# Patient Record
Sex: MALE | Race: WHITE | Employment: OTHER
[De-identification: names, ages, dates, MRNs, and addresses within clinical notes are randomized per-mention and may not be internally consistent; named-entity substitution may affect disease eponyms.]

---

## 2023-04-17 ENCOUNTER — APPOINTMENT (OUTPATIENT)
Facility: HOSPITAL | Age: 38
End: 2023-04-17
Payer: MEDICAID

## 2023-04-17 ENCOUNTER — HOSPITAL ENCOUNTER (INPATIENT)
Facility: HOSPITAL | Age: 38
LOS: 4 days | Discharge: HOSPICE | End: 2023-04-21
Attending: EMERGENCY MEDICINE | Admitting: HOSPITALIST
Payer: MEDICAID

## 2023-04-17 DIAGNOSIS — E87.20 LACTIC ACIDOSIS: ICD-10-CM

## 2023-04-17 DIAGNOSIS — R07.9 CHEST PAIN, UNSPECIFIED TYPE: ICD-10-CM

## 2023-04-17 DIAGNOSIS — R00.0 TACHYCARDIA: ICD-10-CM

## 2023-04-17 DIAGNOSIS — E11.69 TYPE 2 DIABETES MELLITUS WITH OTHER SPECIFIED COMPLICATION, WITHOUT LONG-TERM CURRENT USE OF INSULIN (HCC): ICD-10-CM

## 2023-04-17 DIAGNOSIS — R00.0 SINUS TACHYCARDIA: ICD-10-CM

## 2023-04-17 DIAGNOSIS — R73.9 HYPERGLYCEMIA: Primary | ICD-10-CM

## 2023-04-17 DIAGNOSIS — I10 HYPERTENSION, UNSPECIFIED TYPE: ICD-10-CM

## 2023-04-17 LAB
ALBUMIN SERPL-MCNC: 3.8 G/DL (ref 3.4–5)
ALBUMIN/GLOB SERPL: 1.1 (ref 0.8–1.7)
ALP SERPL-CCNC: 197 U/L (ref 45–117)
ALT SERPL-CCNC: 66 U/L (ref 16–61)
AMPHET UR QL SCN: NEGATIVE
ANION GAP SERPL CALC-SCNC: 8 MMOL/L (ref 3–18)
APPEARANCE UR: CLEAR
AST SERPL-CCNC: 37 U/L (ref 10–38)
BARBITURATES UR QL SCN: NEGATIVE
BASE DEFICIT BLDV-SCNC: 2.1 MMOL/L
BASOPHILS # BLD: 0.1 K/UL (ref 0–0.1)
BASOPHILS NFR BLD: 1 % (ref 0–2)
BENZODIAZ UR QL: NEGATIVE
BILIRUB SERPL-MCNC: 0.8 MG/DL (ref 0.2–1)
BILIRUB UR QL: NEGATIVE
BUN SERPL-MCNC: 14 MG/DL (ref 7–18)
BUN/CREAT SERPL: 15 (ref 12–20)
CALCIUM SERPL-MCNC: 9.2 MG/DL (ref 8.5–10.1)
CANNABINOIDS UR QL SCN: NEGATIVE
CHLORIDE SERPL-SCNC: 98 MMOL/L (ref 100–111)
CO2 SERPL-SCNC: 26 MMOL/L (ref 21–32)
COCAINE UR QL SCN: NEGATIVE
COLOR UR: YELLOW
CREAT SERPL-MCNC: 0.95 MG/DL (ref 0.6–1.3)
D DIMER PPP FEU-MCNC: <0.27 UG/ML(FEU)
DIFFERENTIAL METHOD BLD: ABNORMAL
EOSINOPHIL # BLD: 0.1 K/UL (ref 0–0.4)
EOSINOPHIL NFR BLD: 1 % (ref 0–5)
ERYTHROCYTE [DISTWIDTH] IN BLOOD BY AUTOMATED COUNT: 12.3 % (ref 11.6–14.5)
EST. AVERAGE GLUCOSE BLD GHB EST-MCNC: 246 MG/DL
FLUAV AG NPH QL IA: NEGATIVE
FLUBV AG NOSE QL IA: NEGATIVE
GLOBULIN SER CALC-MCNC: 3.4 G/DL (ref 2–4)
GLUCOSE BLD STRIP.AUTO-MCNC: 238 MG/DL (ref 70–110)
GLUCOSE BLD STRIP.AUTO-MCNC: 405 MG/DL (ref 70–110)
GLUCOSE BLD STRIP.AUTO-MCNC: 422 MG/DL (ref 70–110)
GLUCOSE SERPL-MCNC: 451 MG/DL (ref 74–99)
GLUCOSE UR STRIP.AUTO-MCNC: >1000 MG/DL
HBA1C MFR BLD: 10.2 % (ref 4.2–5.6)
HCO3 BLDV-SCNC: 22.8 MMOL/L (ref 23–28)
HCT VFR BLD AUTO: 48.9 % (ref 36–48)
HGB BLD-MCNC: 16.8 G/DL (ref 13–16)
HGB UR QL STRIP: NEGATIVE
IMM GRANULOCYTES # BLD AUTO: 0.1 K/UL (ref 0–0.04)
IMM GRANULOCYTES NFR BLD AUTO: 1 % (ref 0–0.5)
KETONES UR QL STRIP.AUTO: ABNORMAL MG/DL
LACTATE BLD-SCNC: 0.96 MMOL/L (ref 0.4–2)
LACTATE BLD-SCNC: 2.08 MMOL/L (ref 0.4–2)
LACTATE BLD-SCNC: 2.55 MMOL/L (ref 0.4–2)
LACTATE BLD-SCNC: 3.1 MMOL/L (ref 0.4–2)
LEUKOCYTE ESTERASE UR QL STRIP.AUTO: NEGATIVE
LYMPHOCYTES # BLD: 2.3 K/UL (ref 0.9–3.6)
LYMPHOCYTES NFR BLD: 26 % (ref 21–52)
Lab: NORMAL
MAGNESIUM SERPL-MCNC: 2 MG/DL (ref 1.6–2.6)
MCH RBC QN AUTO: 27.3 PG (ref 24–34)
MCHC RBC AUTO-ENTMCNC: 34.4 G/DL (ref 31–37)
MCV RBC AUTO: 79.5 FL (ref 78–100)
METHADONE UR QL: NEGATIVE
MONOCYTES # BLD: 0.7 K/UL (ref 0.05–1.2)
MONOCYTES NFR BLD: 7 % (ref 3–10)
NEUTS SEG # BLD: 5.9 K/UL (ref 1.8–8)
NEUTS SEG NFR BLD: 65 % (ref 40–73)
NITRITE UR QL STRIP.AUTO: NEGATIVE
NRBC # BLD: 0 K/UL (ref 0–0.01)
NRBC BLD-RTO: 0 PER 100 WBC
NT PRO BNP: 89 PG/ML (ref 0–450)
OPIATES UR QL: NEGATIVE
PCO2 BLDV: 38.7 MMHG (ref 41–51)
PCP UR QL: NEGATIVE
PH BLDV: 7.38 (ref 7.32–7.42)
PH UR STRIP: 5.5 (ref 5–8)
PLATELET # BLD AUTO: 223 K/UL (ref 135–420)
PMV BLD AUTO: 10.3 FL (ref 9.2–11.8)
PO2 BLDV: 37 MMHG (ref 25–40)
POTASSIUM SERPL-SCNC: 4.2 MMOL/L (ref 3.5–5.5)
PROT SERPL-MCNC: 7.2 G/DL (ref 6.4–8.2)
PROT UR STRIP-MCNC: NEGATIVE MG/DL
RBC # BLD AUTO: 6.15 M/UL (ref 4.35–5.65)
SAO2 % BLDV: 69.5 % (ref 65–88)
SARS-COV-2 RDRP RESP QL NAA+PROBE: NOT DETECTED
SERVICE CMNT-IMP: ABNORMAL
SODIUM SERPL-SCNC: 132 MMOL/L (ref 136–145)
SOURCE: NORMAL
SP GR UR REFRACTOMETRY: >1.03 (ref 1–1.03)
SPECIMEN TYPE: ABNORMAL
T4 FREE SERPL-MCNC: 1.5 NG/DL (ref 0.7–1.5)
TROPONIN I SERPL HS-MCNC: 6 NG/L (ref 0–78)
TROPONIN I SERPL HS-MCNC: 6 NG/L (ref 0–78)
TSH SERPL DL<=0.05 MIU/L-ACNC: 1.9 UIU/ML (ref 0.36–3.74)
UROBILINOGEN UR QL STRIP.AUTO: 0.2 EU/DL (ref 0.2–1)
WBC # BLD AUTO: 9.1 K/UL (ref 4.6–13.2)

## 2023-04-17 PROCEDURE — 71045 X-RAY EXAM CHEST 1 VIEW: CPT

## 2023-04-17 PROCEDURE — 1100000003 HC PRIVATE W/ TELEMETRY

## 2023-04-17 PROCEDURE — 87040 BLOOD CULTURE FOR BACTERIA: CPT

## 2023-04-17 PROCEDURE — 96361 HYDRATE IV INFUSION ADD-ON: CPT

## 2023-04-17 PROCEDURE — 2580000003 HC RX 258: Performed by: PHYSICIAN ASSISTANT

## 2023-04-17 PROCEDURE — 84443 ASSAY THYROID STIM HORMONE: CPT

## 2023-04-17 PROCEDURE — 71275 CT ANGIOGRAPHY CHEST: CPT

## 2023-04-17 PROCEDURE — 96366 THER/PROPH/DIAG IV INF ADDON: CPT

## 2023-04-17 PROCEDURE — 82803 BLOOD GASES ANY COMBINATION: CPT

## 2023-04-17 PROCEDURE — 83036 HEMOGLOBIN GLYCOSYLATED A1C: CPT

## 2023-04-17 PROCEDURE — 83605 ASSAY OF LACTIC ACID: CPT

## 2023-04-17 PROCEDURE — 85025 COMPLETE CBC W/AUTO DIFF WBC: CPT

## 2023-04-17 PROCEDURE — 85379 FIBRIN DEGRADATION QUANT: CPT

## 2023-04-17 PROCEDURE — 80053 COMPREHEN METABOLIC PANEL: CPT

## 2023-04-17 PROCEDURE — 87804 INFLUENZA ASSAY W/OPTIC: CPT

## 2023-04-17 PROCEDURE — 84439 ASSAY OF FREE THYROXINE: CPT

## 2023-04-17 PROCEDURE — 74177 CT ABD & PELVIS W/CONTRAST: CPT

## 2023-04-17 PROCEDURE — 80307 DRUG TEST PRSMV CHEM ANLYZR: CPT

## 2023-04-17 PROCEDURE — 93005 ELECTROCARDIOGRAM TRACING: CPT | Performed by: EMERGENCY MEDICINE

## 2023-04-17 PROCEDURE — 96365 THER/PROPH/DIAG IV INF INIT: CPT

## 2023-04-17 PROCEDURE — 6370000000 HC RX 637 (ALT 250 FOR IP): Performed by: PHYSICIAN ASSISTANT

## 2023-04-17 PROCEDURE — 83735 ASSAY OF MAGNESIUM: CPT

## 2023-04-17 PROCEDURE — 82962 GLUCOSE BLOOD TEST: CPT

## 2023-04-17 PROCEDURE — 83880 ASSAY OF NATRIURETIC PEPTIDE: CPT

## 2023-04-17 PROCEDURE — 6360000004 HC RX CONTRAST MEDICATION: Performed by: EMERGENCY MEDICINE

## 2023-04-17 PROCEDURE — 6360000002 HC RX W HCPCS: Performed by: PHYSICIAN ASSISTANT

## 2023-04-17 PROCEDURE — 81003 URINALYSIS AUTO W/O SCOPE: CPT

## 2023-04-17 PROCEDURE — 99285 EMERGENCY DEPT VISIT HI MDM: CPT

## 2023-04-17 PROCEDURE — 87635 SARS-COV-2 COVID-19 AMP PRB: CPT

## 2023-04-17 PROCEDURE — 96375 TX/PRO/DX INJ NEW DRUG ADDON: CPT

## 2023-04-17 PROCEDURE — 84484 ASSAY OF TROPONIN QUANT: CPT

## 2023-04-17 RX ORDER — SODIUM CHLORIDE 9 MG/ML
INJECTION, SOLUTION INTRAVENOUS CONTINUOUS
Status: DISCONTINUED | OUTPATIENT
Start: 2023-04-17 | End: 2023-04-20

## 2023-04-17 RX ORDER — LETROZOLE 2.5 MG/1
TABLET, FILM COATED ORAL
COMMUNITY
Start: 2023-03-20

## 2023-04-17 RX ORDER — SODIUM CHLORIDE, SODIUM LACTATE, POTASSIUM CHLORIDE, AND CALCIUM CHLORIDE .6; .31; .03; .02 G/100ML; G/100ML; G/100ML; G/100ML
1000 INJECTION, SOLUTION INTRAVENOUS ONCE
Status: COMPLETED | OUTPATIENT
Start: 2023-04-17 | End: 2023-04-17

## 2023-04-17 RX ORDER — OMEPRAZOLE 20 MG/1
20 TABLET, DELAYED RELEASE ORAL DAILY PRN
COMMUNITY
Start: 2015-04-10

## 2023-04-17 RX ORDER — TESTOSTERONE 4 MG/D
PATCH TRANSDERMAL
COMMUNITY
Start: 2023-02-25

## 2023-04-17 RX ADMIN — INSULIN HUMAN 5 UNITS: 100 INJECTION, SOLUTION PARENTERAL at 15:17

## 2023-04-17 RX ADMIN — SODIUM CHLORIDE: 9 INJECTION, SOLUTION INTRAVENOUS at 19:42

## 2023-04-17 RX ADMIN — SODIUM CHLORIDE, POTASSIUM CHLORIDE, SODIUM LACTATE AND CALCIUM CHLORIDE 1000 ML: 600; 310; 30; 20 INJECTION, SOLUTION INTRAVENOUS at 13:06

## 2023-04-17 RX ADMIN — PIPERACILLIN AND TAZOBACTAM 4500 MG: 4; .5 INJECTION, POWDER, FOR SOLUTION INTRAVENOUS at 13:06

## 2023-04-17 RX ADMIN — VANCOMYCIN HYDROCHLORIDE 1000 MG: 1 INJECTION, POWDER, LYOPHILIZED, FOR SOLUTION INTRAVENOUS at 13:13

## 2023-04-17 RX ADMIN — SODIUM CHLORIDE, POTASSIUM CHLORIDE, SODIUM LACTATE AND CALCIUM CHLORIDE 1000 ML: 600; 310; 30; 20 INJECTION, SOLUTION INTRAVENOUS at 15:20

## 2023-04-17 RX ADMIN — IOPAMIDOL 100 ML: 755 INJECTION, SOLUTION INTRAVENOUS at 17:43

## 2023-04-17 RX ADMIN — VANCOMYCIN HYDROCHLORIDE 1000 MG: 1 INJECTION, POWDER, LYOPHILIZED, FOR SOLUTION INTRAVENOUS at 15:20

## 2023-04-17 ASSESSMENT — ENCOUNTER SYMPTOMS
VOMITING: 0
COLOR CHANGE: 0
RHINORRHEA: 0
SHORTNESS OF BREATH: 0
ABDOMINAL PAIN: 0
DIARRHEA: 0

## 2023-04-17 ASSESSMENT — PAIN DESCRIPTION - LOCATION: LOCATION: CHEST

## 2023-04-17 ASSESSMENT — LIFESTYLE VARIABLES
HOW MANY STANDARD DRINKS CONTAINING ALCOHOL DO YOU HAVE ON A TYPICAL DAY: 1 OR 2
HOW OFTEN DO YOU HAVE A DRINK CONTAINING ALCOHOL: MONTHLY OR LESS

## 2023-04-17 ASSESSMENT — PAIN SCALES - GENERAL
PAINLEVEL_OUTOF10: 0
PAINLEVEL_OUTOF10: 5

## 2023-04-17 ASSESSMENT — PAIN - FUNCTIONAL ASSESSMENT: PAIN_FUNCTIONAL_ASSESSMENT: 0-10

## 2023-04-17 NOTE — ED NOTES
Pt A&Ox 4. Patient resting comfortably on the stretcher with family at the bedside. Patient has no signs or symptoms of acute distress at this time. Patient has no concerns or questions about their treatment plan.       Idella Leventhal, RN  04/17/23 1956

## 2023-04-17 NOTE — ED NOTES
Pt A&Ox 4. Patient resting comfortably on the stretcher with family at the bedside. Patient has no signs or symptoms of acute distress at this time. Patient has no concerns or questions about their treatment plan.       Shai Tellez RN  04/17/23 1955

## 2023-04-17 NOTE — ED NOTES
POC Lactic 0.98, provider notified.  No new orders at this time     Sukhjinder Hong, RN  04/17/23 1958

## 2023-04-17 NOTE — ED NOTES
Pt A&Ox 4. Patient resting comfortably on the stretcher with family at the bedside. Patient has no signs or symptoms of acute distress at this time. Patient has no concerns or questions about their treatment plan.       Jazmin Murry RN  04/17/23 1956

## 2023-04-17 NOTE — ED NOTES
Pt A&Ox 4. Patient resting comfortably on the stretcher with family at the bedside. Patient has no signs or symptoms of acute distress at this time. Patient has no concerns or questions about their treatment plan.       Pattie Dandy, RN  04/17/23 8521

## 2023-04-17 NOTE — ED PROVIDER NOTES
He agrees with plan to replete fluids and states they will follow-up inpatient. Procedures:  Procedures      Documentation/Prior Results Review:  Previous electrocardiograms. Nursing notes. Previous radiology studies. Diagnosis and Disposition     CLINICAL IMPRESSION:  No diagnosis found. Medication List      You have not been prescribed any medications. Disposition: Admitted        Dragon Disclaimer     Please note that this dictation was completed with [employer], the computer voice recognition software. Quite often unanticipated grammatical, syntax, homophones, and other interpretive errors are inadvertently transcribed by the computer software. Please disregard these errors. Please excuse any errors that have escaped final proofreading.       [place]iss [place], [place]  04/17/23 7482

## 2023-04-17 NOTE — ED TRIAGE NOTES
Pt wheeled to room c/o pain and \"achy\" chest discomfort since this AM. Pt states \"I woke up this morning not feeling well. It hurts on the side of my chest. Motrin helped a little but did not resolve the problem. \" Pt denies radiation, leg swelling, and cardiac hx.

## 2023-04-17 NOTE — ED NOTES
Pt A&Ox 4. Patient resting comfortably on the stretcher with family at the bedside. Patient has no signs or symptoms of acute distress at this time. Patient has no concerns or questions about their treatment plan.       Astrid Francois RN  04/17/23 7100

## 2023-04-17 NOTE — ED NOTES
Pt A&Ox 4. Patient resting comfortably on the stretcher with family at the bedside. Patient has no signs or symptoms of acute distress at this time. Patient has no concerns or questions about their treatment plan.       Robbie Soto RN  04/17/23 1955

## 2023-04-17 NOTE — ED NOTES
Pt A&Ox 4. Patient resting comfortably on the stretcher with family at the bedside. Patient has no signs or symptoms of acute distress at this time. Patient has no concerns or questions about their treatment plan.       Robbie Soto RN  04/17/23 1956

## 2023-04-18 ENCOUNTER — APPOINTMENT (OUTPATIENT)
Facility: HOSPITAL | Age: 38
End: 2023-04-18
Payer: MEDICAID

## 2023-04-18 PROBLEM — A41.9 SEPSIS WITHOUT ACUTE ORGAN DYSFUNCTION (HCC): Status: ACTIVE | Noted: 2023-04-18

## 2023-04-18 PROBLEM — R07.81 PLEURODYNIA: Status: ACTIVE | Noted: 2023-04-18

## 2023-04-18 PROBLEM — E11.9 TYPE II DIABETES MELLITUS (HCC): Status: ACTIVE | Noted: 2023-04-18

## 2023-04-18 PROBLEM — R07.89 ATYPICAL CHEST PAIN: Status: ACTIVE | Noted: 2023-04-18

## 2023-04-18 PROBLEM — I10 HYPERTENSION: Status: ACTIVE | Noted: 2023-04-18

## 2023-04-18 LAB
AMMONIA PLAS-SCNC: 18 UMOL/L (ref 11–32)
BASOPHILS # BLD: 0.1 K/UL (ref 0–0.1)
BASOPHILS NFR BLD: 1 % (ref 0–2)
CRP SERPL HS-MCNC: 4.9 MG/L
DIFFERENTIAL METHOD BLD: ABNORMAL
ECHO BSA: 2.35 M2
EKG ATRIAL RATE: 120 BPM
EKG ATRIAL RATE: 147 BPM
EKG DIAGNOSIS: NORMAL
EKG DIAGNOSIS: NORMAL
EKG P AXIS: 50 DEGREES
EKG P AXIS: 59 DEGREES
EKG P-R INTERVAL: 130 MS
EKG P-R INTERVAL: 154 MS
EKG Q-T INTERVAL: 272 MS
EKG Q-T INTERVAL: 318 MS
EKG QRS DURATION: 76 MS
EKG QRS DURATION: 80 MS
EKG QTC CALCULATION (BAZETT): 425 MS
EKG QTC CALCULATION (BAZETT): 449 MS
EKG R AXIS: 85 DEGREES
EKG R AXIS: 97 DEGREES
EKG T AXIS: 51 DEGREES
EKG T AXIS: 57 DEGREES
EKG VENTRICULAR RATE: 120 BPM
EKG VENTRICULAR RATE: 147 BPM
EOSINOPHIL # BLD: 0.1 K/UL (ref 0–0.4)
EOSINOPHIL NFR BLD: 1 % (ref 0–5)
ERYTHROCYTE [DISTWIDTH] IN BLOOD BY AUTOMATED COUNT: 12.6 % (ref 11.6–14.5)
ERYTHROCYTE [SEDIMENTATION RATE] IN BLOOD: 12 MM/HR (ref 0–15)
GLUCOSE BLD STRIP.AUTO-MCNC: 239 MG/DL (ref 70–110)
GLUCOSE BLD STRIP.AUTO-MCNC: 242 MG/DL (ref 70–110)
GLUCOSE BLD STRIP.AUTO-MCNC: 260 MG/DL (ref 70–110)
GLUCOSE BLD STRIP.AUTO-MCNC: 276 MG/DL (ref 70–110)
GLUCOSE BLD STRIP.AUTO-MCNC: 281 MG/DL (ref 70–110)
HCT VFR BLD AUTO: 44.5 % (ref 36–48)
HGB BLD-MCNC: 15.1 G/DL (ref 13–16)
IMM GRANULOCYTES # BLD AUTO: 0.1 K/UL (ref 0–0.04)
IMM GRANULOCYTES NFR BLD AUTO: 1 % (ref 0–0.5)
LACTATE SERPL-SCNC: 1.1 MMOL/L (ref 0.4–2)
LYMPHOCYTES # BLD: 2 K/UL (ref 0.9–3.6)
LYMPHOCYTES NFR BLD: 21 % (ref 21–52)
MCH RBC QN AUTO: 26.9 PG (ref 24–34)
MCHC RBC AUTO-ENTMCNC: 33.9 G/DL (ref 31–37)
MCV RBC AUTO: 79.3 FL (ref 78–100)
MONOCYTES # BLD: 0.8 K/UL (ref 0.05–1.2)
MONOCYTES NFR BLD: 8 % (ref 3–10)
NEUTS SEG # BLD: 6.9 K/UL (ref 1.8–8)
NEUTS SEG NFR BLD: 70 % (ref 40–73)
NRBC # BLD: 0 K/UL (ref 0–0.01)
NRBC BLD-RTO: 0 PER 100 WBC
PLATELET # BLD AUTO: 197 K/UL (ref 135–420)
PMV BLD AUTO: 10 FL (ref 9.2–11.8)
RBC # BLD AUTO: 5.61 M/UL (ref 4.35–5.65)
WBC # BLD AUTO: 9.9 K/UL (ref 4.6–13.2)

## 2023-04-18 PROCEDURE — 6370000000 HC RX 637 (ALT 250 FOR IP): Performed by: STUDENT IN AN ORGANIZED HEALTH CARE EDUCATION/TRAINING PROGRAM

## 2023-04-18 PROCEDURE — 2580000003 HC RX 258: Performed by: STUDENT IN AN ORGANIZED HEALTH CARE EDUCATION/TRAINING PROGRAM

## 2023-04-18 PROCEDURE — A4216 STERILE WATER/SALINE, 10 ML: HCPCS | Performed by: STUDENT IN AN ORGANIZED HEALTH CARE EDUCATION/TRAINING PROGRAM

## 2023-04-18 PROCEDURE — 99222 1ST HOSP IP/OBS MODERATE 55: CPT | Performed by: INTERNAL MEDICINE

## 2023-04-18 PROCEDURE — 6370000000 HC RX 637 (ALT 250 FOR IP): Performed by: INTERNAL MEDICINE

## 2023-04-18 PROCEDURE — 2580000003 HC RX 258: Performed by: PHYSICIAN ASSISTANT

## 2023-04-18 PROCEDURE — 6360000002 HC RX W HCPCS: Performed by: STUDENT IN AN ORGANIZED HEALTH CARE EDUCATION/TRAINING PROGRAM

## 2023-04-18 PROCEDURE — 93970 EXTREMITY STUDY: CPT

## 2023-04-18 PROCEDURE — 87086 URINE CULTURE/COLONY COUNT: CPT

## 2023-04-18 PROCEDURE — 85652 RBC SED RATE AUTOMATED: CPT

## 2023-04-18 PROCEDURE — 36415 COLL VENOUS BLD VENIPUNCTURE: CPT

## 2023-04-18 PROCEDURE — 82140 ASSAY OF AMMONIA: CPT

## 2023-04-18 PROCEDURE — 6370000000 HC RX 637 (ALT 250 FOR IP): Performed by: PHYSICIAN ASSISTANT

## 2023-04-18 PROCEDURE — C9113 INJ PANTOPRAZOLE SODIUM, VIA: HCPCS | Performed by: STUDENT IN AN ORGANIZED HEALTH CARE EDUCATION/TRAINING PROGRAM

## 2023-04-18 PROCEDURE — 93010 ELECTROCARDIOGRAM REPORT: CPT | Performed by: INTERNAL MEDICINE

## 2023-04-18 PROCEDURE — 86141 C-REACTIVE PROTEIN HS: CPT

## 2023-04-18 PROCEDURE — 6360000002 HC RX W HCPCS: Performed by: INTERNAL MEDICINE

## 2023-04-18 PROCEDURE — 99223 1ST HOSP IP/OBS HIGH 75: CPT | Performed by: STUDENT IN AN ORGANIZED HEALTH CARE EDUCATION/TRAINING PROGRAM

## 2023-04-18 PROCEDURE — 82962 GLUCOSE BLOOD TEST: CPT

## 2023-04-18 PROCEDURE — 93005 ELECTROCARDIOGRAM TRACING: CPT | Performed by: STUDENT IN AN ORGANIZED HEALTH CARE EDUCATION/TRAINING PROGRAM

## 2023-04-18 PROCEDURE — 1100000003 HC PRIVATE W/ TELEMETRY

## 2023-04-18 PROCEDURE — 85025 COMPLETE CBC W/AUTO DIFF WBC: CPT

## 2023-04-18 PROCEDURE — 83605 ASSAY OF LACTIC ACID: CPT

## 2023-04-18 PROCEDURE — 2580000003 HC RX 258: Performed by: INTERNAL MEDICINE

## 2023-04-18 RX ORDER — DEXTROSE MONOHYDRATE 100 MG/ML
INJECTION, SOLUTION INTRAVENOUS CONTINUOUS PRN
Status: DISCONTINUED | OUTPATIENT
Start: 2023-04-18 | End: 2023-04-21 | Stop reason: HOSPADM

## 2023-04-18 RX ORDER — INSULIN LISPRO 100 [IU]/ML
0-4 INJECTION, SOLUTION INTRAVENOUS; SUBCUTANEOUS
Status: DISCONTINUED | OUTPATIENT
Start: 2023-04-18 | End: 2023-04-21 | Stop reason: HOSPADM

## 2023-04-18 RX ORDER — AMLODIPINE BESYLATE 5 MG/1
7.5 TABLET ORAL ONCE
Status: COMPLETED | OUTPATIENT
Start: 2023-04-18 | End: 2023-04-18

## 2023-04-18 RX ORDER — ASPIRIN 81 MG/1
81 TABLET, CHEWABLE ORAL DAILY
Status: DISCONTINUED | OUTPATIENT
Start: 2023-04-18 | End: 2023-04-21 | Stop reason: HOSPADM

## 2023-04-18 RX ORDER — AMLODIPINE BESYLATE 5 MG/1
2.5 TABLET ORAL DAILY
Status: DISCONTINUED | OUTPATIENT
Start: 2023-04-18 | End: 2023-04-18

## 2023-04-18 RX ORDER — ENOXAPARIN SODIUM 100 MG/ML
30 INJECTION SUBCUTANEOUS 2 TIMES DAILY
Status: DISCONTINUED | OUTPATIENT
Start: 2023-04-18 | End: 2023-04-21 | Stop reason: HOSPADM

## 2023-04-18 RX ORDER — ATORVASTATIN CALCIUM 20 MG/1
20 TABLET, FILM COATED ORAL NIGHTLY
Status: DISCONTINUED | OUTPATIENT
Start: 2023-04-18 | End: 2023-04-21 | Stop reason: HOSPADM

## 2023-04-18 RX ORDER — INSULIN LISPRO 100 [IU]/ML
0-4 INJECTION, SOLUTION INTRAVENOUS; SUBCUTANEOUS NIGHTLY
Status: DISCONTINUED | OUTPATIENT
Start: 2023-04-18 | End: 2023-04-21 | Stop reason: HOSPADM

## 2023-04-18 RX ORDER — AMLODIPINE BESYLATE 10 MG/1
10 TABLET ORAL DAILY
Status: DISCONTINUED | OUTPATIENT
Start: 2023-04-19 | End: 2023-04-21 | Stop reason: HOSPADM

## 2023-04-18 RX ORDER — INSULIN GLARGINE 100 [IU]/ML
10 INJECTION, SOLUTION SUBCUTANEOUS NIGHTLY
Status: DISCONTINUED | OUTPATIENT
Start: 2023-04-18 | End: 2023-04-19

## 2023-04-18 RX ADMIN — PIPERACILLIN AND TAZOBACTAM 3375 MG: 3; .375 INJECTION, POWDER, LYOPHILIZED, FOR SOLUTION INTRAVENOUS at 18:48

## 2023-04-18 RX ADMIN — AMLODIPINE BESYLATE 2.5 MG: 5 TABLET ORAL at 12:41

## 2023-04-18 RX ADMIN — SODIUM CHLORIDE, PRESERVATIVE FREE 40 MG: 5 INJECTION INTRAVENOUS at 09:26

## 2023-04-18 RX ADMIN — AMLODIPINE BESYLATE 7.5 MG: 5 TABLET ORAL at 17:28

## 2023-04-18 RX ADMIN — SODIUM CHLORIDE: 9 INJECTION, SOLUTION INTRAVENOUS at 15:39

## 2023-04-18 RX ADMIN — SODIUM CHLORIDE: 9 INJECTION, SOLUTION INTRAVENOUS at 05:50

## 2023-04-18 RX ADMIN — INSULIN LISPRO 2 UNITS: 100 INJECTION, SOLUTION INTRAVENOUS; SUBCUTANEOUS at 12:39

## 2023-04-18 RX ADMIN — INSULIN LISPRO 1 UNITS: 100 INJECTION, SOLUTION INTRAVENOUS; SUBCUTANEOUS at 21:13

## 2023-04-18 RX ADMIN — PIPERACILLIN AND TAZOBACTAM 4500 MG: 4; .5 INJECTION, POWDER, LYOPHILIZED, FOR SOLUTION INTRAVENOUS at 05:52

## 2023-04-18 RX ADMIN — INSULIN GLARGINE 10 UNITS: 100 INJECTION, SOLUTION SUBCUTANEOUS at 21:34

## 2023-04-18 RX ADMIN — INSULIN LISPRO 2 UNITS: 100 INJECTION, SOLUTION INTRAVENOUS; SUBCUTANEOUS at 08:30

## 2023-04-18 RX ADMIN — ATORVASTATIN CALCIUM 20 MG: 20 TABLET, FILM COATED ORAL at 21:03

## 2023-04-18 RX ADMIN — INSULIN LISPRO 1 UNITS: 100 INJECTION, SOLUTION INTRAVENOUS; SUBCUTANEOUS at 17:01

## 2023-04-18 RX ADMIN — ENOXAPARIN SODIUM 30 MG: 100 INJECTION SUBCUTANEOUS at 09:25

## 2023-04-18 RX ADMIN — VANCOMYCIN HYDROCHLORIDE 1250 MG: 10 INJECTION, POWDER, LYOPHILIZED, FOR SOLUTION INTRAVENOUS at 06:04

## 2023-04-18 RX ADMIN — PIPERACILLIN AND TAZOBACTAM 3375 MG: 3; .375 INJECTION, POWDER, LYOPHILIZED, FOR SOLUTION INTRAVENOUS at 12:41

## 2023-04-18 RX ADMIN — ENOXAPARIN SODIUM 30 MG: 100 INJECTION SUBCUTANEOUS at 21:00

## 2023-04-18 RX ADMIN — VANCOMYCIN HYDROCHLORIDE 1500 MG: 10 INJECTION, POWDER, LYOPHILIZED, FOR SOLUTION INTRAVENOUS at 17:07

## 2023-04-18 RX ADMIN — ASPIRIN 81 MG CHEWABLE TABLET 81 MG: 81 TABLET CHEWABLE at 17:28

## 2023-04-18 ASSESSMENT — PAIN SCALES - GENERAL
PAINLEVEL_OUTOF10: 0

## 2023-04-18 NOTE — ED NOTES
History     Chief Complaint:  Chest Pain      HPI   Ezequiel Holloway is a 57 year old male who presents with right-sided pleuritic chest pian. He reports having gone a 25 mile bike ride yesterday without abnormally elevated heart rate. He later went on a walk and his heart rate increased to as high as 150. This morning he noticed pleuritic chest pain chest pain and reports that leaning forward worsens his pain. He denies cough fever, recent travel, smoking, and current use of blood thinners. Patient is COVID vaccinated.  He does have a history of PE remotely.  He was briefly anticoagulated for that.      Review of Systems   Constitutional: Negative for fever.   Respiratory: Negative for cough.    Cardiovascular: Positive for chest pain.         Allergies:  The patient has no known allergies.  No Known Allergies      Medications:    zantac  Lipitor    Past Medical History:    DVT  GERD  Allergic rhinitis  Collapsed lung due to rip fracture  Thoracic outlet syndrome  Lupus anticoagulant disorder  Childhood asthma  Hyperlipidemia  Dysphagia       Past Surgical History:   Esphagogastroduodenoscopy  tonstil and adenoidectomy      Left first rib removed for thoracic outlet syndrome     Family History:    Father: Alzheimer, diabetes  Mother: skin cancer, hypertension, hyperlipidemia, stroke      Social History:  History of alcohol use  Patient was unaccompanied      Physical Exam     Patient Vitals for the past 24 hrs:   BP Temp Temp src Pulse Resp SpO2 Weight   08/27/21 1816 -- -- -- -- 16 -- --   08/27/21 1746 -- -- -- -- -- -- 73.9 kg (163 lb)   08/27/21 1744 -- -- -- -- -- 99 % --   08/27/21 1730 126/85 -- -- 67 -- 98 % --   08/27/21 1715 124/79 -- -- 64 -- 99 % --   08/27/21 1700 128/87 -- -- 64 -- 99 % --   08/27/21 1645 (!) 134/90 -- -- 67 13 99 % --   08/27/21 1633 -- -- -- 70 12 99 % --   08/27/21 1631 (!) 165/111 -- -- 71 18 98 % --   08/27/21 1238 127/68 98.3  F (36.8  C) Oral 74 18 98 % --       Physical  Transport arrived to take pt.  Pt A&Ox4, no ADS or change in condition at time of transport     Alannah Moctezuma RN  04/17/23 9019 Exam    Physical Exam   Constitutional:  Patient is oriented to person, place, and time. They appear well-developed and well-nourished.  HENT:   Mouth/Throat:   Oropharynx is clear and moist.   Eyes:    Conjunctivae normal and EOM are normal. Pupils are equal, round, and reactive to light.   Neck:    Normal range of motion.   Cardiovascular: Normal rate, regular rhythm and normal heart sounds.  Exam reveals no gallop and no friction rub.  No murmur heard. right sided pleuritic chest pain  Pulmonary/Chest:  Effort normal and breath sounds normal. Patient has no wheezes. Patient has no rales.   Abdominal:   Soft. Bowel sounds are normal. Patient exhibits no mass. There is no tenderness. There is no rebound and no guarding.   Musculoskeletal:  Normal range of motion. Patient exhibits no edema.   Neurological:   Patient is alert and oriented to person, place, and time. Patient has normal strength. No cranial nerve deficit or sensory deficit. GCS 15  Skin:   Skin is warm and dry. No rash noted. No erythema.   Psychiatric:   Patient has a normal mood and affect. Patient's behavior is normal. Judgment and thought content normal.         Emergency Department Course   ECG:  ECG taken at 1236, ECG read at 1240  Normal sinus rhythm  Possible left atrial enlargement  Borderline ECG .  Rate 76 bpm. IN interval 120 ms. QRS duration 82 ms. QT/QTc 374/420 ms. P-R-T axes 75 71 40.     Imaging:  XR Chest 2 Views  Negative chest. Lungs clear. No pleural effusions. No  Pneumothorax.  Reading per radiology      Laboratory:  Troponin (Collected 1242): <0.015  CBC: WBC 8.7, HGB 41.5,    BMP: carbon dioxide 33 (H), anion gap 2 (L) o/w WNL (Creatinine 1.08)   Ddimer: <0.27      Emergency Department Course:    Reviewed:  I reviewed nursing notes, vitals, past history and care everywhere    Assessments:  1620 I obtained history and examined the patient as noted above.   1735 I rechecked the patient and explained findings.    Disposition:  The patient was discharged to home.    Impression & Plan        Medical Decision Making:  Ezequiel Holloway is a 57-year-old gentleman presenting with right-sided chest pain.  He has a history of blood clot so was concerned for this.  He denies any trauma.  He has been doing normal exercise without exercise intolerance.  He had very pleuritic pain here but not reproducible with palpation.  He denies any recent infectious symptoms.  EKG shows no evidence of ischemia or arrhythmia, troponin is normal, D-dimer is also normal.  He is not acutely anemic nor does he have any acute metabolic derangement.  Chest x-ray is performed that shows no pneumonia, pneumothorax, effusion, abnormal mediastinum.  This sounds like benign pleuritic pain.  At this time given his work-up and is low risk factors for cardiovascular disease I feel he is safe for discharge.  He will follow-up with his primary care doctor.      Diagnosis:    ICD-10-CM    1. Pleuritic chest pain  R07.81        Scribe Disclosure:  I, Joel Ruiz, am serving as a scribe at 4:30 PM on 8/27/2021 to document services personally performed by Radha Freire MD based on my observations and the provider's statements to me.      aRdha Freire MD  08/27/21 1957

## 2023-04-18 NOTE — ED NOTES
Called [facility] spoke to Gaines provided patient information.  I was given eta 4-5hrs    Clld MMT spoke to [facility] no ALS transport tonight only BLS

## 2023-04-18 NOTE — CONSULTS
[facility] - Consult Note    Cardiology consultation request from Dr. Pascual Pedro for evaluation and management/treatment of CP     Date of  Admission: 4/17/2023 11:56 AM   Primary Care Physician:  Awilda Belle MD    Attending Cardiologist: Dr. Rigoberto Mccarty    Assessment:     Patient Active Problem List    Diagnosis Date Noted    Sepsis without acute organ dysfunction ([facility] [address] 04/18/2023    Type II diabetes mellitus ([facility] [address] 04/18/2023    Pleurodynia 04/18/2023    Hyperglycemia 04/17/2023     -Possible Sepsis, unclear source, possible perianal abscess. MRI pending.   -Chest pain, atypical. MI r/o. ECG sinus tachycardia with early repolarization, also noted on historical ECGs.   -HTN, uncontrolled.   -DM2, new dx. HgbA1c 10.2.   -H/o Burkitt's Lymphoma, in remission   -Hypogonadism, on testosterone.   -Retinitis pigmentosa, blindness  -Paternal hx CAD   -Obesity        No Primary cardiologist, previously seen by Dr. Boom Veliz (2011)      Plan:     -No further episodes of CP, MI has been r/o. Echo ordered and pending. If no significant findings on Echo, will plan for outpatient stress testing to r/o underlying CAD given multiple risk facts: Obesity, HTN, DM, sedentary lifestyle, male. -Hypertensive, will start low dose amlodipine 2.5 mg.   -DM mgmt per primary team.   -Would treat with statin with underlying DM, if able to tolerate.   -Tachycardia likely physiologic, would not aggressively treat at this time.   -Continue supportive care for possible sepsis. Follow Bcx. MRI pelvis pending to r/o perianal abscess.    -Further recommendations pending hospital course/test results. History of Present Illness: This is a 41 y.o. male admitted for Lactic acidosis [E87.20]  Tachycardia [R00.0]  Hyperglycemia [R73.9]  Chest pain, unspecified type [R07.9]. Patient complains of: CP   Yamilet Carrero is a 41 y.o. male, pmhx as stated above, who we are seeing for CP.  Patient presented with left sided CP that awoke him from his
noted to be tachycardic with heart rates in the 140s to 150s. The associated glucose was 422 with a hemoglobin A1c of 10.2. Lactic acid was elevated at 3.10. Blood cultures have been sent and currently are with without growth. Urine cultures pending. Chest x-ray without any acute pulmonary findings. CT of the chest abdomen and pelvis were also otherwise negative with the exception of hepatic steatosis. There is no notable adenopathy documented. He was started on empiric vancomycin and Zosyn on 4/17. Past Medical History:   Diagnosis Date    Blind      No past surgical history on file. No family history on file.   Medications reviewed as below:   Current Facility-Administered Medications   Medication Dose Route Frequency Provider Last Rate Last Admin    glucose chewable tablet 16 g  4 tablet Oral PRN Lesley Wyatt, DO        dextrose bolus 10% 125 mL  125 mL IntraVENous PRN Lesley Wyatt, DO        Or    dextrose bolus 10% 250 mL  250 mL IntraVENous PRN Lesley Wyatt, DO        glucagon (rDNA) injection 1 mg  1 mg SubCUTAneous PRN Lesley Wyatt, DO        dextrose 10 % infusion   IntraVENous Continuous PRN Lesley Wyatt, DO        insulin lispro (HUMALOG) injection vial 0-4 Units  0-4 Units SubCUTAneous TID WC Lesley Wyatt, DO   2 Units at 04/18/23 1239    insulin lispro (HUMALOG) injection vial 0-4 Units  0-4 Units SubCUTAneous Nightly Lesley Wyatt, DO        pantoprazole (PROTONIX) 40 mg in sodium chloride (PF) 0.9 % 10 mL injection  40 mg IntraVENous Daily Lesley Wyatt, DO   40 mg at 04/18/23 0926    piperacillin-tazobactam (ZOSYN) 3,375 mg in sodium chloride 0.9 % 100 mL extended IVPB (mini-bag)  3,375 mg IntraVENous Q8H Lesley Wyatt, DO 25 mL/hr at 04/18/23 1241 3,375 mg at 04/18/23 1241    enoxaparin Sodium (LOVENOX) injection 30 mg  30 mg SubCUTAneous BID Lesley Wyatt, DO   30 mg at 04/18/23 0925    perflutren lipid microspheres (DEFINITY) injection 2 mL  2 mL

## 2023-04-18 NOTE — H&P
Tobacco Use    Smoking status: Never    Smokeless tobacco: Never   Substance and Sexual Activity    Alcohol use: Not Currently    Drug use: Never       Prior to Admission medications    Medication Sig Start Date End Date Taking? Authorizing Provider   omeprazole (PRILOSEC OTC) 20 MG tablet Take 1 tablet by mouth daily as needed 4/10/15  Yes Historical Provider, MD   ANDRODERM 4 MG/24HR PT24 patch APPLY 2 PATCHES TOPICALLY TO CLEAN SKIN EVERY DAY 23   Historical Provider, MD   letrozole ([address] [facility]) 2.5 MG tablet TAKE 1 TABLET BY MOUTH 2 TIMES A WEEK 3/20/23   Historical Provider, MD       No Known Allergies      Review of Systems  GENERAL: No fever, chills, malaise, weight changes  HEENT: +blindness, no earache, tinnitus, sore throat or sinus congestion. NECK: No pain or stiffness. PULMONARY: No shortness of breath, cough or wheeze. Cardiovascular: no pnd or orthopnea, no CP (currently resolved)   GASTROINTESTINAL: No abdominal pain, nausea, vomiting or diarrhea, melena or bright red blood per rectum. GENITOURINARY: No urinary frequency, urgency, hesitancy or dysuria. MUSCULOSKELETAL: No joint or muscle pain, no back pain, no recent trauma. DERMATOLOGIC: No rash, no itching, no lesions. ENDOCRINE: No polyuria, polydipsia, no heat or cold intolerance. No recent change in weight. HEMATOLOGICAL: No anemia or easy bruising or bleeding. NEUROLOGIC: No headache, seizures, numbness, tingling or weakness. Physical Exam:     Physical Exam:  BP (!) 163/104   Pulse (!) 117   Temp 97.8 °F (36.6 °C) (Oral)   Resp 19   Ht 6' (1.829 m)   Wt 240 lb (108.9 kg)   SpO2 95%   BMI 32.55 kg/m²         Temp (24hrs), Av.3 °F (36.8 °C), Min:97.8 °F (36.6 °C), Max:99 °F (37.2 °C)    No intake/output data recorded.  0701 -  1900  In: 1100   Out: -     General:  Alert, cooperative, no distress, appears stated age. Head: Normocephalic, without obvious abnormality, atraumatic.

## 2023-04-19 ENCOUNTER — APPOINTMENT (OUTPATIENT)
Facility: HOSPITAL | Age: 38
End: 2023-04-19
Payer: MEDICAID

## 2023-04-19 PROBLEM — R07.9 CHEST PAIN: Status: ACTIVE | Noted: 2023-04-19

## 2023-04-19 PROBLEM — I77.810 DILATED AORTIC ROOT (HCC): Status: ACTIVE | Noted: 2023-04-19

## 2023-04-19 PROBLEM — I42.9 CARDIOMYOPATHY (HCC): Status: ACTIVE | Noted: 2023-04-19

## 2023-04-19 LAB
ALBUMIN SERPL-MCNC: 3.1 G/DL (ref 3.4–5)
ALBUMIN/GLOB SERPL: 0.9 (ref 0.8–1.7)
ALP SERPL-CCNC: 134 U/L (ref 45–117)
ALT SERPL-CCNC: 49 U/L (ref 16–61)
ANION GAP SERPL CALC-SCNC: 6 MMOL/L (ref 3–18)
AST SERPL-CCNC: 17 U/L (ref 10–38)
BACTERIA SPEC CULT: NORMAL
BASOPHILS # BLD: 0.1 K/UL (ref 0–0.1)
BASOPHILS NFR BLD: 1 % (ref 0–2)
BILIRUB SERPL-MCNC: 0.7 MG/DL (ref 0.2–1)
BUN SERPL-MCNC: 11 MG/DL (ref 7–18)
BUN/CREAT SERPL: 15 (ref 12–20)
CALCIUM SERPL-MCNC: 8.7 MG/DL (ref 8.5–10.1)
CHLORIDE SERPL-SCNC: 105 MMOL/L (ref 100–111)
CO2 SERPL-SCNC: 24 MMOL/L (ref 21–32)
CREAT SERPL-MCNC: 0.74 MG/DL (ref 0.6–1.3)
DIFFERENTIAL METHOD BLD: ABNORMAL
ECHO AO ASC DIAM: 3.2 CM
ECHO AO ASCENDING AORTA INDEX: 1.39 CM/M2
ECHO AO ROOT DIAM: 3.9 CM
ECHO AO ROOT INDEX: 1.7 CM/M2
ECHO AV AREA PEAK VELOCITY: 3.4 CM2
ECHO AV AREA VTI: 4.1 CM2
ECHO AV AREA/BSA PEAK VELOCITY: 1.5 CM2/M2
ECHO AV AREA/BSA VTI: 1.8 CM2/M2
ECHO AV MEAN GRADIENT: 3 MMHG
ECHO AV MEAN VELOCITY: 0.9 M/S
ECHO AV PEAK GRADIENT: 6 MMHG
ECHO AV PEAK VELOCITY: 1.2 M/S
ECHO AV VELOCITY RATIO: 0.83
ECHO AV VTI: 18.9 CM
ECHO BSA: 2.35 M2
ECHO EST RA PRESSURE: 8 MMHG
ECHO LA VOL 2C: 48 ML (ref 18–58)
ECHO LA VOL 2C: 52 ML (ref 18–58)
ECHO LA VOL 4C: 29 ML (ref 18–58)
ECHO LA VOL 4C: 32 ML (ref 18–58)
ECHO LA VOL BP: 37 ML (ref 18–58)
ECHO LA VOL/BSA BIPLANE: 16 ML/M2 (ref 16–34)
ECHO LA VOLUME AREA LENGTH: 41 ML
ECHO LA VOLUME INDEX AREA LENGTH: 18 ML/M2 (ref 16–34)
ECHO LV E' LATERAL VELOCITY: 11 CM/S
ECHO LV EDV A2C: 99 ML
ECHO LV EDV A4C: 100 ML
ECHO LV EDV BP: 105 ML (ref 67–155)
ECHO LV EDV INDEX A4C: 43 ML/M2
ECHO LV EDV INDEX BP: 46 ML/M2
ECHO LV EDV NDEX A2C: 43 ML/M2
ECHO LV EJECTION FRACTION A2C: 54 %
ECHO LV EJECTION FRACTION A4C: 52 %
ECHO LV EJECTION FRACTION BIPLANE: 54 % (ref 55–100)
ECHO LV ESV A2C: 45 ML
ECHO LV ESV A4C: 49 ML
ECHO LV ESV BP: 49 ML (ref 22–58)
ECHO LV ESV INDEX A2C: 20 ML/M2
ECHO LV ESV INDEX A4C: 21 ML/M2
ECHO LV ESV INDEX BP: 21 ML/M2
ECHO LV FRACTIONAL SHORTENING: 24 % (ref 28–44)
ECHO LV INTERNAL DIMENSION DIASTOLE INDEX: 1.65 CM/M2
ECHO LV INTERNAL DIMENSION DIASTOLIC: 3.8 CM (ref 4.2–5.9)
ECHO LV INTERNAL DIMENSION SYSTOLIC INDEX: 1.26 CM/M2
ECHO LV INTERNAL DIMENSION SYSTOLIC: 2.9 CM
ECHO LV IVSD: 1.3 CM (ref 0.6–1)
ECHO LV MASS 2D: 173.1 G (ref 88–224)
ECHO LV MASS INDEX 2D: 75.2 G/M2 (ref 49–115)
ECHO LV POSTERIOR WALL DIASTOLIC: 1.3 CM (ref 0.6–1)
ECHO LV RELATIVE WALL THICKNESS RATIO: 0.68
ECHO LVOT AREA: 4.2 CM2
ECHO LVOT AV VTI INDEX: 0.96
ECHO LVOT DIAM: 2.3 CM
ECHO LVOT MEAN GRADIENT: 2 MMHG
ECHO LVOT PEAK GRADIENT: 4 MMHG
ECHO LVOT PEAK VELOCITY: 1 M/S
ECHO LVOT STROKE VOLUME INDEX: 32.9 ML/M2
ECHO LVOT SV: 75.6 ML
ECHO LVOT VTI: 18.2 CM
ECHO MV AREA VTI: 3 CM2
ECHO MV LVOT VTI INDEX: 1.39
ECHO MV MAX VELOCITY: 1.7 M/S
ECHO MV MEAN GRADIENT: 3 MMHG
ECHO MV MEAN VELOCITY: 0.7 M/S
ECHO MV PEAK GRADIENT: 12 MMHG
ECHO MV VTI: 25.3 CM
ECHO PV MAX VELOCITY: 1.1 M/S
ECHO PV PEAK GRADIENT: 5 MMHG
ECHO RIGHT VENTRICULAR SYSTOLIC PRESSURE (RVSP): 29 MMHG
ECHO RV BASAL DIMENSION: 3.8 CM
ECHO RV FREE WALL PEAK S': 20 CM/S
ECHO RV TAPSE: 2.2 CM (ref 1.7–?)
ECHO TV REGURGITANT MAX VELOCITY: 2.27 M/S
ECHO TV REGURGITANT PEAK GRADIENT: 21 MMHG
EOSINOPHIL # BLD: 0.2 K/UL (ref 0–0.4)
EOSINOPHIL NFR BLD: 2 % (ref 0–5)
ERYTHROCYTE [DISTWIDTH] IN BLOOD BY AUTOMATED COUNT: 12.4 % (ref 11.6–14.5)
GLOBULIN SER CALC-MCNC: 3.3 G/DL (ref 2–4)
GLUCOSE BLD STRIP.AUTO-MCNC: 227 MG/DL (ref 70–110)
GLUCOSE BLD STRIP.AUTO-MCNC: 230 MG/DL (ref 70–110)
GLUCOSE BLD STRIP.AUTO-MCNC: 255 MG/DL (ref 70–110)
GLUCOSE BLD STRIP.AUTO-MCNC: 278 MG/DL (ref 70–110)
GLUCOSE SERPL-MCNC: 255 MG/DL (ref 74–99)
HCT VFR BLD AUTO: 42.9 % (ref 36–48)
HGB BLD-MCNC: 14.5 G/DL (ref 13–16)
IMM GRANULOCYTES # BLD AUTO: 0.1 K/UL (ref 0–0.04)
IMM GRANULOCYTES NFR BLD AUTO: 1 % (ref 0–0.5)
LYMPHOCYTES # BLD: 2.3 K/UL (ref 0.9–3.6)
LYMPHOCYTES NFR BLD: 23 % (ref 21–52)
MCH RBC QN AUTO: 27.2 PG (ref 24–34)
MCHC RBC AUTO-ENTMCNC: 33.8 G/DL (ref 31–37)
MCV RBC AUTO: 80.5 FL (ref 78–100)
MONOCYTES # BLD: 0.8 K/UL (ref 0.05–1.2)
MONOCYTES NFR BLD: 8 % (ref 3–10)
NEUTS SEG # BLD: 6.5 K/UL (ref 1.8–8)
NEUTS SEG NFR BLD: 66 % (ref 40–73)
NRBC # BLD: 0 K/UL (ref 0–0.01)
NRBC BLD-RTO: 0 PER 100 WBC
PLATELET # BLD AUTO: 204 K/UL (ref 135–420)
PMV BLD AUTO: 9.5 FL (ref 9.2–11.8)
POTASSIUM SERPL-SCNC: 3.7 MMOL/L (ref 3.5–5.5)
PROT SERPL-MCNC: 6.4 G/DL (ref 6.4–8.2)
RBC # BLD AUTO: 5.33 M/UL (ref 4.35–5.65)
SERVICE CMNT-IMP: NORMAL
SODIUM SERPL-SCNC: 135 MMOL/L (ref 136–145)
VANCOMYCIN SERPL-MCNC: 7.6 UG/ML (ref 5–40)
WBC # BLD AUTO: 9.9 K/UL (ref 4.6–13.2)

## 2023-04-19 PROCEDURE — 2580000003 HC RX 258: Performed by: INTERNAL MEDICINE

## 2023-04-19 PROCEDURE — 93306 TTE W/DOPPLER COMPLETE: CPT | Performed by: INTERNAL MEDICINE

## 2023-04-19 PROCEDURE — 2580000003 HC RX 258: Performed by: PHYSICIAN ASSISTANT

## 2023-04-19 PROCEDURE — 36415 COLL VENOUS BLD VENIPUNCTURE: CPT

## 2023-04-19 PROCEDURE — 85025 COMPLETE CBC W/AUTO DIFF WBC: CPT

## 2023-04-19 PROCEDURE — 80053 COMPREHEN METABOLIC PANEL: CPT

## 2023-04-19 PROCEDURE — 99222 1ST HOSP IP/OBS MODERATE 55: CPT | Performed by: INTERNAL MEDICINE

## 2023-04-19 PROCEDURE — C9113 INJ PANTOPRAZOLE SODIUM, VIA: HCPCS | Performed by: STUDENT IN AN ORGANIZED HEALTH CARE EDUCATION/TRAINING PROGRAM

## 2023-04-19 PROCEDURE — 6360000002 HC RX W HCPCS: Performed by: INTERNAL MEDICINE

## 2023-04-19 PROCEDURE — 82962 GLUCOSE BLOOD TEST: CPT

## 2023-04-19 PROCEDURE — 72195 MRI PELVIS W/O DYE: CPT

## 2023-04-19 PROCEDURE — 1100000003 HC PRIVATE W/ TELEMETRY

## 2023-04-19 PROCEDURE — 6370000000 HC RX 637 (ALT 250 FOR IP): Performed by: STUDENT IN AN ORGANIZED HEALTH CARE EDUCATION/TRAINING PROGRAM

## 2023-04-19 PROCEDURE — 94761 N-INVAS EAR/PLS OXIMETRY MLT: CPT

## 2023-04-19 PROCEDURE — 2700000000 HC OXYGEN THERAPY PER DAY

## 2023-04-19 PROCEDURE — 6360000004 HC RX CONTRAST MEDICATION: Performed by: FAMILY MEDICINE

## 2023-04-19 PROCEDURE — 6360000002 HC RX W HCPCS: Performed by: STUDENT IN AN ORGANIZED HEALTH CARE EDUCATION/TRAINING PROGRAM

## 2023-04-19 PROCEDURE — 6360000002 HC RX W HCPCS: Performed by: FAMILY MEDICINE

## 2023-04-19 PROCEDURE — 99232 SBSQ HOSP IP/OBS MODERATE 35: CPT | Performed by: FAMILY MEDICINE

## 2023-04-19 PROCEDURE — 80202 ASSAY OF VANCOMYCIN: CPT

## 2023-04-19 PROCEDURE — 6370000000 HC RX 637 (ALT 250 FOR IP): Performed by: PHYSICIAN ASSISTANT

## 2023-04-19 PROCEDURE — 2580000003 HC RX 258: Performed by: STUDENT IN AN ORGANIZED HEALTH CARE EDUCATION/TRAINING PROGRAM

## 2023-04-19 PROCEDURE — 6370000000 HC RX 637 (ALT 250 FOR IP): Performed by: INTERNAL MEDICINE

## 2023-04-19 PROCEDURE — C8929 TTE W OR WO FOL WCON,DOPPLER: HCPCS

## 2023-04-19 PROCEDURE — 6370000000 HC RX 637 (ALT 250 FOR IP): Performed by: FAMILY MEDICINE

## 2023-04-19 RX ORDER — LORAZEPAM 2 MG/ML
2 INJECTION INTRAMUSCULAR ONCE
Status: COMPLETED | OUTPATIENT
Start: 2023-04-19 | End: 2023-04-19

## 2023-04-19 RX ORDER — CARVEDILOL 6.25 MG/1
6.25 TABLET ORAL 2 TIMES DAILY WITH MEALS
Status: DISCONTINUED | OUTPATIENT
Start: 2023-04-19 | End: 2023-04-20

## 2023-04-19 RX ORDER — INSULIN GLARGINE 100 [IU]/ML
17 INJECTION, SOLUTION SUBCUTANEOUS NIGHTLY
Status: DISCONTINUED | OUTPATIENT
Start: 2023-04-19 | End: 2023-04-21 | Stop reason: HOSPADM

## 2023-04-19 RX ORDER — MORPHINE SULFATE 2 MG/ML
2 INJECTION, SOLUTION INTRAMUSCULAR; INTRAVENOUS ONCE
Status: COMPLETED | OUTPATIENT
Start: 2023-04-19 | End: 2023-04-19

## 2023-04-19 RX ADMIN — PIPERACILLIN AND TAZOBACTAM 3375 MG: 3; .375 INJECTION, POWDER, LYOPHILIZED, FOR SOLUTION INTRAVENOUS at 12:06

## 2023-04-19 RX ADMIN — MORPHINE SULFATE 2 MG: 2 INJECTION, SOLUTION INTRAMUSCULAR; INTRAVENOUS at 17:23

## 2023-04-19 RX ADMIN — SODIUM CHLORIDE, PRESERVATIVE FREE 40 MG: 5 INJECTION INTRAVENOUS at 08:18

## 2023-04-19 RX ADMIN — VANCOMYCIN HYDROCHLORIDE 1500 MG: 10 INJECTION, POWDER, LYOPHILIZED, FOR SOLUTION INTRAVENOUS at 18:33

## 2023-04-19 RX ADMIN — PERFLUTREN 2 ML: 6.52 INJECTION, SUSPENSION INTRAVENOUS at 10:59

## 2023-04-19 RX ADMIN — LORAZEPAM 2 MG: 2 INJECTION INTRAMUSCULAR at 15:50

## 2023-04-19 RX ADMIN — INSULIN LISPRO 1 UNITS: 100 INJECTION, SOLUTION INTRAVENOUS; SUBCUTANEOUS at 18:32

## 2023-04-19 RX ADMIN — CARVEDILOL 6.25 MG: 6.25 TABLET, FILM COATED ORAL at 18:32

## 2023-04-19 RX ADMIN — SODIUM CHLORIDE: 9 INJECTION, SOLUTION INTRAVENOUS at 00:32

## 2023-04-19 RX ADMIN — INSULIN GLARGINE 17 UNITS: 100 INJECTION, SOLUTION SUBCUTANEOUS at 21:18

## 2023-04-19 RX ADMIN — ASPIRIN 81 MG CHEWABLE TABLET 81 MG: 81 TABLET CHEWABLE at 08:18

## 2023-04-19 RX ADMIN — PIPERACILLIN AND TAZOBACTAM 3375 MG: 3; .375 INJECTION, POWDER, LYOPHILIZED, FOR SOLUTION INTRAVENOUS at 02:24

## 2023-04-19 RX ADMIN — ENOXAPARIN SODIUM 30 MG: 100 INJECTION SUBCUTANEOUS at 08:17

## 2023-04-19 RX ADMIN — PIPERACILLIN AND TAZOBACTAM 3375 MG: 3; .375 INJECTION, POWDER, LYOPHILIZED, FOR SOLUTION INTRAVENOUS at 21:10

## 2023-04-19 RX ADMIN — INSULIN LISPRO 1 UNITS: 100 INJECTION, SOLUTION INTRAVENOUS; SUBCUTANEOUS at 12:06

## 2023-04-19 RX ADMIN — VANCOMYCIN HYDROCHLORIDE 1500 MG: 10 INJECTION, POWDER, LYOPHILIZED, FOR SOLUTION INTRAVENOUS at 05:31

## 2023-04-19 RX ADMIN — AMLODIPINE BESYLATE 10 MG: 10 TABLET ORAL at 08:18

## 2023-04-19 RX ADMIN — ENOXAPARIN SODIUM 30 MG: 100 INJECTION SUBCUTANEOUS at 21:10

## 2023-04-19 RX ADMIN — INSULIN LISPRO 1 UNITS: 100 INJECTION, SOLUTION INTRAVENOUS; SUBCUTANEOUS at 08:16

## 2023-04-19 RX ADMIN — ATORVASTATIN CALCIUM 20 MG: 20 TABLET, FILM COATED ORAL at 21:10

## 2023-04-19 RX ADMIN — CARVEDILOL 6.25 MG: 6.25 TABLET, FILM COATED ORAL at 09:50

## 2023-04-19 ASSESSMENT — PAIN SCALES - GENERAL
PAINLEVEL_OUTOF10: 0
PAINLEVEL_OUTOF10: 0
PAINLEVEL_OUTOF10: 8

## 2023-04-19 ASSESSMENT — PAIN DESCRIPTION - ORIENTATION: ORIENTATION: POSTERIOR

## 2023-04-19 ASSESSMENT — PAIN DESCRIPTION - DESCRIPTORS: DESCRIPTORS: BURNING

## 2023-04-19 ASSESSMENT — PAIN DESCRIPTION - LOCATION: LOCATION: BUTTOCKS

## 2023-04-20 LAB
ALBUMIN SERPL-MCNC: 3 G/DL (ref 3.4–5)
ALBUMIN/GLOB SERPL: 0.9 (ref 0.8–1.7)
ALP SERPL-CCNC: 131 U/L (ref 45–117)
ALT SERPL-CCNC: 45 U/L (ref 16–61)
ANION GAP SERPL CALC-SCNC: 6 MMOL/L (ref 3–18)
AST SERPL-CCNC: 22 U/L (ref 10–38)
BASOPHILS # BLD: 0.1 K/UL (ref 0–0.1)
BASOPHILS NFR BLD: 1 % (ref 0–2)
BILIRUB SERPL-MCNC: 0.7 MG/DL (ref 0.2–1)
BUN SERPL-MCNC: 14 MG/DL (ref 7–18)
BUN/CREAT SERPL: 19 (ref 12–20)
CALCIUM SERPL-MCNC: 9.2 MG/DL (ref 8.5–10.1)
CHLORIDE SERPL-SCNC: 104 MMOL/L (ref 100–111)
CO2 SERPL-SCNC: 27 MMOL/L (ref 21–32)
CREAT SERPL-MCNC: 0.75 MG/DL (ref 0.6–1.3)
DATE LAST DOSE: ABNORMAL
DIFFERENTIAL METHOD BLD: ABNORMAL
DOSE AMOUNT: ABNORMAL UNITS
DOSE DATE/TIME: ABNORMAL
EOSINOPHIL # BLD: 0.2 K/UL (ref 0–0.4)
EOSINOPHIL NFR BLD: 2 % (ref 0–5)
ERYTHROCYTE [DISTWIDTH] IN BLOOD BY AUTOMATED COUNT: 12.6 % (ref 11.6–14.5)
GLOBULIN SER CALC-MCNC: 3.3 G/DL (ref 2–4)
GLUCOSE BLD STRIP.AUTO-MCNC: 242 MG/DL (ref 70–110)
GLUCOSE BLD STRIP.AUTO-MCNC: 259 MG/DL (ref 70–110)
GLUCOSE BLD STRIP.AUTO-MCNC: 263 MG/DL (ref 70–110)
GLUCOSE BLD STRIP.AUTO-MCNC: 265 MG/DL (ref 70–110)
GLUCOSE SERPL-MCNC: 241 MG/DL (ref 74–99)
HCT VFR BLD AUTO: 41.1 % (ref 36–48)
HGB BLD-MCNC: 14.3 G/DL (ref 13–16)
IMM GRANULOCYTES # BLD AUTO: 0.1 K/UL (ref 0–0.04)
IMM GRANULOCYTES NFR BLD AUTO: 1 % (ref 0–0.5)
LYMPHOCYTES # BLD: 2 K/UL (ref 0.9–3.6)
LYMPHOCYTES NFR BLD: 21 % (ref 21–52)
MCH RBC QN AUTO: 27.7 PG (ref 24–34)
MCHC RBC AUTO-ENTMCNC: 34.8 G/DL (ref 31–37)
MCV RBC AUTO: 79.7 FL (ref 78–100)
MONOCYTES # BLD: 0.7 K/UL (ref 0.05–1.2)
MONOCYTES NFR BLD: 8 % (ref 3–10)
NEUTS SEG # BLD: 6.5 K/UL (ref 1.8–8)
NEUTS SEG NFR BLD: 69 % (ref 40–73)
NRBC # BLD: 0 K/UL (ref 0–0.01)
NRBC BLD-RTO: 0 PER 100 WBC
PLATELET # BLD AUTO: 196 K/UL (ref 135–420)
PMV BLD AUTO: 10.2 FL (ref 9.2–11.8)
POTASSIUM SERPL-SCNC: 3.3 MMOL/L (ref 3.5–5.5)
PROT SERPL-MCNC: 6.3 G/DL (ref 6.4–8.2)
RBC # BLD AUTO: 5.16 M/UL (ref 4.35–5.65)
SODIUM SERPL-SCNC: 137 MMOL/L (ref 136–145)
VANCOMYCIN TROUGH SERPL-MCNC: 7.3 UG/ML (ref 10–20)
WBC # BLD AUTO: 9.5 K/UL (ref 4.6–13.2)

## 2023-04-20 PROCEDURE — 6360000002 HC RX W HCPCS: Performed by: STUDENT IN AN ORGANIZED HEALTH CARE EDUCATION/TRAINING PROGRAM

## 2023-04-20 PROCEDURE — 6370000000 HC RX 637 (ALT 250 FOR IP): Performed by: INTERNAL MEDICINE

## 2023-04-20 PROCEDURE — 2580000003 HC RX 258: Performed by: INTERNAL MEDICINE

## 2023-04-20 PROCEDURE — 6360000002 HC RX W HCPCS: Performed by: INTERNAL MEDICINE

## 2023-04-20 PROCEDURE — 80053 COMPREHEN METABOLIC PANEL: CPT

## 2023-04-20 PROCEDURE — 1100000003 HC PRIVATE W/ TELEMETRY

## 2023-04-20 PROCEDURE — A4216 STERILE WATER/SALINE, 10 ML: HCPCS | Performed by: STUDENT IN AN ORGANIZED HEALTH CARE EDUCATION/TRAINING PROGRAM

## 2023-04-20 PROCEDURE — 83605 ASSAY OF LACTIC ACID: CPT

## 2023-04-20 PROCEDURE — 85025 COMPLETE CBC W/AUTO DIFF WBC: CPT

## 2023-04-20 PROCEDURE — 6370000000 HC RX 637 (ALT 250 FOR IP): Performed by: FAMILY MEDICINE

## 2023-04-20 PROCEDURE — 94761 N-INVAS EAR/PLS OXIMETRY MLT: CPT

## 2023-04-20 PROCEDURE — 2580000003 HC RX 258: Performed by: STUDENT IN AN ORGANIZED HEALTH CARE EDUCATION/TRAINING PROGRAM

## 2023-04-20 PROCEDURE — 80202 ASSAY OF VANCOMYCIN: CPT

## 2023-04-20 PROCEDURE — 82962 GLUCOSE BLOOD TEST: CPT

## 2023-04-20 PROCEDURE — 36415 COLL VENOUS BLD VENIPUNCTURE: CPT

## 2023-04-20 PROCEDURE — 2700000000 HC OXYGEN THERAPY PER DAY

## 2023-04-20 PROCEDURE — 6370000000 HC RX 637 (ALT 250 FOR IP): Performed by: STUDENT IN AN ORGANIZED HEALTH CARE EDUCATION/TRAINING PROGRAM

## 2023-04-20 PROCEDURE — 6370000000 HC RX 637 (ALT 250 FOR IP): Performed by: PHYSICIAN ASSISTANT

## 2023-04-20 PROCEDURE — C9113 INJ PANTOPRAZOLE SODIUM, VIA: HCPCS | Performed by: STUDENT IN AN ORGANIZED HEALTH CARE EDUCATION/TRAINING PROGRAM

## 2023-04-20 RX ORDER — ACETAMINOPHEN 325 MG/1
650 TABLET ORAL EVERY 4 HOURS PRN
Status: DISCONTINUED | OUTPATIENT
Start: 2023-04-20 | End: 2023-04-21 | Stop reason: HOSPADM

## 2023-04-20 RX ORDER — MECOBALAMIN 5000 MCG
5 TABLET,DISINTEGRATING ORAL NIGHTLY
Status: DISCONTINUED | OUTPATIENT
Start: 2023-04-20 | End: 2023-04-21 | Stop reason: HOSPADM

## 2023-04-20 RX ORDER — CARVEDILOL 12.5 MG/1
12.5 TABLET ORAL 2 TIMES DAILY WITH MEALS
Status: DISCONTINUED | OUTPATIENT
Start: 2023-04-20 | End: 2023-04-21

## 2023-04-20 RX ADMIN — PIPERACILLIN AND TAZOBACTAM 3375 MG: 3; .375 INJECTION, POWDER, LYOPHILIZED, FOR SOLUTION INTRAVENOUS at 16:17

## 2023-04-20 RX ADMIN — PIPERACILLIN AND TAZOBACTAM 3375 MG: 3; .375 INJECTION, POWDER, LYOPHILIZED, FOR SOLUTION INTRAVENOUS at 08:48

## 2023-04-20 RX ADMIN — AMLODIPINE BESYLATE 10 MG: 10 TABLET ORAL at 08:42

## 2023-04-20 RX ADMIN — ASPIRIN 81 MG CHEWABLE TABLET 81 MG: 81 TABLET CHEWABLE at 08:43

## 2023-04-20 RX ADMIN — INSULIN LISPRO 1 UNITS: 100 INJECTION, SOLUTION INTRAVENOUS; SUBCUTANEOUS at 08:40

## 2023-04-20 RX ADMIN — CARVEDILOL 12.5 MG: 12.5 TABLET, FILM COATED ORAL at 16:18

## 2023-04-20 RX ADMIN — ATORVASTATIN CALCIUM 20 MG: 20 TABLET, FILM COATED ORAL at 21:46

## 2023-04-20 RX ADMIN — Medication 5 MG: at 21:46

## 2023-04-20 RX ADMIN — VANCOMYCIN HYDROCHLORIDE 1500 MG: 10 INJECTION, POWDER, LYOPHILIZED, FOR SOLUTION INTRAVENOUS at 21:45

## 2023-04-20 RX ADMIN — ENOXAPARIN SODIUM 30 MG: 100 INJECTION SUBCUTANEOUS at 21:46

## 2023-04-20 RX ADMIN — VANCOMYCIN HYDROCHLORIDE 1500 MG: 10 INJECTION, POWDER, LYOPHILIZED, FOR SOLUTION INTRAVENOUS at 05:02

## 2023-04-20 RX ADMIN — ENOXAPARIN SODIUM 30 MG: 100 INJECTION SUBCUTANEOUS at 08:43

## 2023-04-20 RX ADMIN — INSULIN LISPRO 2 UNITS: 100 INJECTION, SOLUTION INTRAVENOUS; SUBCUTANEOUS at 21:47

## 2023-04-20 RX ADMIN — INSULIN GLARGINE 17 UNITS: 100 INJECTION, SOLUTION SUBCUTANEOUS at 21:48

## 2023-04-20 RX ADMIN — INSULIN LISPRO 2 UNITS: 100 INJECTION, SOLUTION INTRAVENOUS; SUBCUTANEOUS at 12:01

## 2023-04-20 RX ADMIN — INSULIN LISPRO 2 UNITS: 100 INJECTION, SOLUTION INTRAVENOUS; SUBCUTANEOUS at 16:51

## 2023-04-20 RX ADMIN — VANCOMYCIN HYDROCHLORIDE 1500 MG: 10 INJECTION, POWDER, LYOPHILIZED, FOR SOLUTION INTRAVENOUS at 14:47

## 2023-04-20 RX ADMIN — SODIUM CHLORIDE, PRESERVATIVE FREE 40 MG: 5 INJECTION INTRAVENOUS at 08:43

## 2023-04-20 RX ADMIN — CARVEDILOL 6.25 MG: 6.25 TABLET, FILM COATED ORAL at 08:42

## 2023-04-20 RX ADMIN — ACETAMINOPHEN 650 MG: 325 TABLET ORAL at 16:57

## 2023-04-20 ASSESSMENT — PAIN SCALES - GENERAL
PAINLEVEL_OUTOF10: 0

## 2023-04-20 NOTE — CARE COORDINATION
AM    Patient Admission Status: Inpatient   Readmission Risk (Low < 19, Mod (19-27), High > 27): Readmission Risk Score: 7.4    Current PCP: Idania Bryson MD  PCP verified by ? (P) Yes (sees endocrinologist Dr. Burns Flow every 6 months)    Chart Reviewed: Yes      History Provided by: (P) Patient  Patient Orientation: (P) Alert and Oriented    Patient Cognition: (P) Alert    Hospitalization in the last 30 days (Readmission):  No    If yes, Readmission Assessment in  Navigator will be completed. Advance Directives:      Code Status: Full Code   Patient's Primary Decision Maker is: Legal Next of Kin      Discharge Planning:    Patient lives with: (P) Family Members, Parent Type of Home: (P) House  Primary Care Giver: (P) Self (family support r/t being legally blind)  Patient Support Systems include: (P) Parent, Family Members   Current Financial resources: (P) Medicaid  Current community resources:    Current services prior to admission: (P) None            Current DME:              Type of Home Care services:  (P) None    ADLS  Prior functional level: (P) Assistance with the following:, Housework, Cooking, Shopping (otherwise independent)  Current functional level:      PT AM-PAC:   /24  OT AM-PAC:   /24    Family can provide assistance at DC: (P) Yes  Would you like Case Management to discuss the discharge plan with any other family members/significant others, and if so, who?     Plans to Return to Present Housing: (P) Yes  Other Identified Issues/Barriers to RETURNING to current housing: none noted  Potential Assistance needed at discharge: (P) N/A            Potential DME:    Patient expects to discharge to: (P) [address] [place] [address] for transportation at discharge:      Financial    Payor: [employer] [address] / Plan: [employer]  [address] / Product Type: *No Product type* /     Does insurance require precert for SNF: has medicaid    Potential assistance Purchasing Medications:    Meds-to-Beds request:      No Pharmacies

## 2023-04-21 VITALS
WEIGHT: 240 LBS | DIASTOLIC BLOOD PRESSURE: 72 MMHG | SYSTOLIC BLOOD PRESSURE: 108 MMHG | BODY MASS INDEX: 32.51 KG/M2 | HEART RATE: 129 BPM | RESPIRATION RATE: 39 BRPM | HEIGHT: 72 IN | OXYGEN SATURATION: 94 % | TEMPERATURE: 98 F

## 2023-04-21 LAB
ALBUMIN SERPL-MCNC: 3.1 G/DL (ref 3.4–5)
ALBUMIN/GLOB SERPL: 0.9 (ref 0.8–1.7)
ALP SERPL-CCNC: 129 U/L (ref 45–117)
ALT SERPL-CCNC: 53 U/L (ref 16–61)
ANION GAP SERPL CALC-SCNC: 5 MMOL/L (ref 3–18)
AST SERPL-CCNC: 26 U/L (ref 10–38)
BASOPHILS # BLD: 0 K/UL (ref 0–0.1)
BASOPHILS NFR BLD: 1 % (ref 0–2)
BILIRUB SERPL-MCNC: 1.1 MG/DL (ref 0.2–1)
BUN SERPL-MCNC: 12 MG/DL (ref 7–18)
BUN/CREAT SERPL: 17 (ref 12–20)
CALCIUM SERPL-MCNC: 9 MG/DL (ref 8.5–10.1)
CHLORIDE SERPL-SCNC: 103 MMOL/L (ref 100–111)
CO2 SERPL-SCNC: 26 MMOL/L (ref 21–32)
CREAT SERPL-MCNC: 0.72 MG/DL (ref 0.6–1.3)
DIFFERENTIAL METHOD BLD: ABNORMAL
EOSINOPHIL # BLD: 0.1 K/UL (ref 0–0.4)
EOSINOPHIL NFR BLD: 1 % (ref 0–5)
ERYTHROCYTE [DISTWIDTH] IN BLOOD BY AUTOMATED COUNT: 12.8 % (ref 11.6–14.5)
GLOBULIN SER CALC-MCNC: 3.5 G/DL (ref 2–4)
GLUCOSE BLD STRIP.AUTO-MCNC: 212 MG/DL (ref 70–110)
GLUCOSE BLD STRIP.AUTO-MCNC: 238 MG/DL (ref 70–110)
GLUCOSE SERPL-MCNC: 224 MG/DL (ref 74–99)
HCT VFR BLD AUTO: 41.8 % (ref 36–48)
HGB BLD-MCNC: 14.5 G/DL (ref 13–16)
IMM GRANULOCYTES # BLD AUTO: 0.1 K/UL (ref 0–0.04)
IMM GRANULOCYTES NFR BLD AUTO: 1 % (ref 0–0.5)
LACTATE SERPL-SCNC: 1.1 MMOL/L (ref 0.4–2)
LYMPHOCYTES # BLD: 0.7 K/UL (ref 0.9–3.6)
LYMPHOCYTES NFR BLD: 9 % (ref 21–52)
MCH RBC QN AUTO: 27.5 PG (ref 24–34)
MCHC RBC AUTO-ENTMCNC: 34.7 G/DL (ref 31–37)
MCV RBC AUTO: 79.3 FL (ref 78–100)
MONOCYTES # BLD: 0.6 K/UL (ref 0.05–1.2)
MONOCYTES NFR BLD: 8 % (ref 3–10)
NEUTS SEG # BLD: 6.3 K/UL (ref 1.8–8)
NEUTS SEG NFR BLD: 81 % (ref 40–73)
NRBC # BLD: 0 K/UL (ref 0–0.01)
NRBC BLD-RTO: 0 PER 100 WBC
PLATELET # BLD AUTO: 183 K/UL (ref 135–420)
PMV BLD AUTO: 10.2 FL (ref 9.2–11.8)
POTASSIUM SERPL-SCNC: 3.5 MMOL/L (ref 3.5–5.5)
PROT SERPL-MCNC: 6.6 G/DL (ref 6.4–8.2)
RBC # BLD AUTO: 5.27 M/UL (ref 4.35–5.65)
SODIUM SERPL-SCNC: 134 MMOL/L (ref 136–145)
WBC # BLD AUTO: 7.9 K/UL (ref 4.6–13.2)

## 2023-04-21 PROCEDURE — 2700000000 HC OXYGEN THERAPY PER DAY

## 2023-04-21 PROCEDURE — 6370000000 HC RX 637 (ALT 250 FOR IP): Performed by: PHYSICIAN ASSISTANT

## 2023-04-21 PROCEDURE — 99232 SBSQ HOSP IP/OBS MODERATE 35: CPT | Performed by: INTERNAL MEDICINE

## 2023-04-21 PROCEDURE — 97530 THERAPEUTIC ACTIVITIES: CPT

## 2023-04-21 PROCEDURE — 97535 SELF CARE MNGMENT TRAINING: CPT

## 2023-04-21 PROCEDURE — 6360000002 HC RX W HCPCS: Performed by: STUDENT IN AN ORGANIZED HEALTH CARE EDUCATION/TRAINING PROGRAM

## 2023-04-21 PROCEDURE — 2580000003 HC RX 258: Performed by: INTERNAL MEDICINE

## 2023-04-21 PROCEDURE — C9113 INJ PANTOPRAZOLE SODIUM, VIA: HCPCS | Performed by: STUDENT IN AN ORGANIZED HEALTH CARE EDUCATION/TRAINING PROGRAM

## 2023-04-21 PROCEDURE — 97161 PT EVAL LOW COMPLEX 20 MIN: CPT

## 2023-04-21 PROCEDURE — 6370000000 HC RX 637 (ALT 250 FOR IP): Performed by: STUDENT IN AN ORGANIZED HEALTH CARE EDUCATION/TRAINING PROGRAM

## 2023-04-21 PROCEDURE — 6360000002 HC RX W HCPCS: Performed by: INTERNAL MEDICINE

## 2023-04-21 PROCEDURE — 6360000002 HC RX W HCPCS: Performed by: PHYSICIAN ASSISTANT

## 2023-04-21 PROCEDURE — 94761 N-INVAS EAR/PLS OXIMETRY MLT: CPT

## 2023-04-21 PROCEDURE — 36415 COLL VENOUS BLD VENIPUNCTURE: CPT

## 2023-04-21 PROCEDURE — 2580000003 HC RX 258: Performed by: STUDENT IN AN ORGANIZED HEALTH CARE EDUCATION/TRAINING PROGRAM

## 2023-04-21 PROCEDURE — 80053 COMPREHEN METABOLIC PANEL: CPT

## 2023-04-21 PROCEDURE — 6370000000 HC RX 637 (ALT 250 FOR IP): Performed by: INTERNAL MEDICINE

## 2023-04-21 PROCEDURE — 97165 OT EVAL LOW COMPLEX 30 MIN: CPT

## 2023-04-21 PROCEDURE — 82962 GLUCOSE BLOOD TEST: CPT

## 2023-04-21 PROCEDURE — 85025 COMPLETE CBC W/AUTO DIFF WBC: CPT

## 2023-04-21 RX ORDER — METOPROLOL TARTRATE 50 MG/1
50 TABLET, FILM COATED ORAL 2 TIMES DAILY
Status: DISCONTINUED | OUTPATIENT
Start: 2023-04-21 | End: 2023-04-21 | Stop reason: HOSPADM

## 2023-04-21 RX ORDER — AMLODIPINE BESYLATE 10 MG/1
10 TABLET ORAL DAILY
Qty: 30 TABLET | Refills: 0 | Status: SHIPPED | OUTPATIENT
Start: 2023-04-22

## 2023-04-21 RX ORDER — ASPIRIN 81 MG/1
81 TABLET, CHEWABLE ORAL DAILY
Qty: 30 TABLET | Refills: 0 | Status: SHIPPED | OUTPATIENT
Start: 2023-04-22 | End: 2023-04-21 | Stop reason: SDUPTHER

## 2023-04-21 RX ORDER — AMLODIPINE BESYLATE 10 MG/1
10 TABLET ORAL DAILY
Qty: 30 TABLET | Refills: 0 | Status: SHIPPED | OUTPATIENT
Start: 2023-04-22 | End: 2023-04-21 | Stop reason: SDUPTHER

## 2023-04-21 RX ORDER — PEN NEEDLE, DIABETIC 30 GX5/16"
1 NEEDLE, DISPOSABLE MISCELLANEOUS DAILY
Qty: 100 EACH | Refills: 3 | Status: SHIPPED | OUTPATIENT
Start: 2023-04-21 | End: 2023-04-21 | Stop reason: SDUPTHER

## 2023-04-21 RX ORDER — INSULIN GLARGINE 100 [IU]/ML
25 INJECTION, SOLUTION SUBCUTANEOUS NIGHTLY
Qty: 5 ADJUSTABLE DOSE PRE-FILLED PEN SYRINGE | Refills: 0 | Status: SHIPPED | OUTPATIENT
Start: 2023-04-21 | End: 2023-04-21 | Stop reason: SDUPTHER

## 2023-04-21 RX ORDER — BLOOD-GLUCOSE METER
1 KIT MISCELLANEOUS DAILY
Qty: 1 KIT | Refills: 0 | Status: SHIPPED | OUTPATIENT
Start: 2023-04-21 | End: 2023-04-21 | Stop reason: SDUPTHER

## 2023-04-21 RX ORDER — GLUCOSAMINE HCL/CHONDROITIN SU 500-400 MG
CAPSULE ORAL
Qty: 60 STRIP | Refills: 0 | Status: SHIPPED | OUTPATIENT
Start: 2023-04-21

## 2023-04-21 RX ORDER — ATORVASTATIN CALCIUM 20 MG/1
20 TABLET, FILM COATED ORAL NIGHTLY
Qty: 30 TABLET | Refills: 3 | Status: SHIPPED | OUTPATIENT
Start: 2023-04-21

## 2023-04-21 RX ORDER — METOPROLOL TARTRATE 50 MG/1
50 TABLET, FILM COATED ORAL 2 TIMES DAILY
Qty: 60 TABLET | Refills: 0 | Status: SHIPPED | OUTPATIENT
Start: 2023-04-21

## 2023-04-21 RX ORDER — DIGOXIN 0.25 MG/ML
250 INJECTION INTRAMUSCULAR; INTRAVENOUS ONCE
Status: COMPLETED | OUTPATIENT
Start: 2023-04-21 | End: 2023-04-21

## 2023-04-21 RX ORDER — LANCETS 30 GAUGE
1 EACH MISCELLANEOUS 2 TIMES DAILY
Qty: 300 EACH | Refills: 1 | Status: SHIPPED | OUTPATIENT
Start: 2023-04-21

## 2023-04-21 RX ORDER — LANCETS 30 GAUGE
1 EACH MISCELLANEOUS 2 TIMES DAILY
Qty: 300 EACH | Refills: 1 | Status: SHIPPED | OUTPATIENT
Start: 2023-04-21 | End: 2023-04-21 | Stop reason: SDUPTHER

## 2023-04-21 RX ORDER — INSULIN GLARGINE 100 [IU]/ML
25 INJECTION, SOLUTION SUBCUTANEOUS NIGHTLY
Qty: 5 ADJUSTABLE DOSE PRE-FILLED PEN SYRINGE | Refills: 0 | Status: SHIPPED | OUTPATIENT
Start: 2023-04-21

## 2023-04-21 RX ORDER — BLOOD-GLUCOSE METER
1 KIT MISCELLANEOUS DAILY
Qty: 1 KIT | Refills: 0 | Status: SHIPPED | OUTPATIENT
Start: 2023-04-21

## 2023-04-21 RX ORDER — PEN NEEDLE, DIABETIC 30 GX5/16"
1 NEEDLE, DISPOSABLE MISCELLANEOUS DAILY
Qty: 100 EACH | Refills: 3 | Status: SHIPPED | OUTPATIENT
Start: 2023-04-21

## 2023-04-21 RX ORDER — ATORVASTATIN CALCIUM 20 MG/1
20 TABLET, FILM COATED ORAL NIGHTLY
Qty: 30 TABLET | Refills: 3 | Status: SHIPPED | OUTPATIENT
Start: 2023-04-21 | End: 2023-04-21 | Stop reason: SDUPTHER

## 2023-04-21 RX ORDER — ASPIRIN 81 MG/1
81 TABLET, CHEWABLE ORAL DAILY
Qty: 30 TABLET | Refills: 0 | Status: SHIPPED | OUTPATIENT
Start: 2023-04-22

## 2023-04-21 RX ORDER — METOPROLOL TARTRATE 50 MG/1
50 TABLET, FILM COATED ORAL 2 TIMES DAILY
Qty: 60 TABLET | Refills: 0 | Status: SHIPPED | OUTPATIENT
Start: 2023-04-21 | End: 2023-04-21 | Stop reason: SDUPTHER

## 2023-04-21 RX ORDER — GLUCOSAMINE HCL/CHONDROITIN SU 500-400 MG
CAPSULE ORAL
Qty: 60 STRIP | Refills: 0 | Status: SHIPPED | OUTPATIENT
Start: 2023-04-21 | End: 2023-04-21 | Stop reason: SDUPTHER

## 2023-04-21 RX ADMIN — AMLODIPINE BESYLATE 10 MG: 10 TABLET ORAL at 09:39

## 2023-04-21 RX ADMIN — METOPROLOL TARTRATE 50 MG: 50 TABLET, FILM COATED ORAL at 13:25

## 2023-04-21 RX ADMIN — INSULIN LISPRO 1 UNITS: 100 INJECTION, SOLUTION INTRAVENOUS; SUBCUTANEOUS at 12:26

## 2023-04-21 RX ADMIN — ENOXAPARIN SODIUM 30 MG: 100 INJECTION SUBCUTANEOUS at 09:39

## 2023-04-21 RX ADMIN — VANCOMYCIN HYDROCHLORIDE 1500 MG: 10 INJECTION, POWDER, LYOPHILIZED, FOR SOLUTION INTRAVENOUS at 05:30

## 2023-04-21 RX ADMIN — CARVEDILOL 12.5 MG: 12.5 TABLET, FILM COATED ORAL at 09:39

## 2023-04-21 RX ADMIN — DIGOXIN 250 MCG: 0.25 INJECTION INTRAMUSCULAR; INTRAVENOUS at 13:25

## 2023-04-21 RX ADMIN — PIPERACILLIN AND TAZOBACTAM 3375 MG: 3; .375 INJECTION, POWDER, LYOPHILIZED, FOR SOLUTION INTRAVENOUS at 09:40

## 2023-04-21 RX ADMIN — PIPERACILLIN AND TAZOBACTAM 3375 MG: 3; .375 INJECTION, POWDER, LYOPHILIZED, FOR SOLUTION INTRAVENOUS at 00:36

## 2023-04-21 RX ADMIN — VANCOMYCIN HYDROCHLORIDE 1500 MG: 10 INJECTION, POWDER, LYOPHILIZED, FOR SOLUTION INTRAVENOUS at 13:28

## 2023-04-21 RX ADMIN — SODIUM CHLORIDE, PRESERVATIVE FREE 40 MG: 5 INJECTION INTRAVENOUS at 09:38

## 2023-04-21 RX ADMIN — ASPIRIN 81 MG CHEWABLE TABLET 81 MG: 81 TABLET CHEWABLE at 09:38

## 2023-04-21 ASSESSMENT — PAIN SCALES - GENERAL
PAINLEVEL_OUTOF10: 0

## 2023-04-21 NOTE — DISCHARGE INSTRUCTIONS
Check blood sugar 4x times a day - before breakfast , before lunch , before dinner, at bedtime. If sugar is above >200 you would need insulin. Follow protocol as indicated. If blood sugar falls below 70 try eating something sweet as quickly as possible. Recheck blood sugar 15 min later and repeat until blood sugar is 90 or above.

## 2023-04-21 NOTE — PROGRESS NOTES
conducted an initial consultation and Spiritual Assessment for Lor Mcgill, who is a 41 y.o.,male. Patient's Primary Language is: [place]. According to the patient's EMR Islam Affiliation is: [facility].     The reason the Patient came to the hospital is:   Patient Active Problem List    Diagnosis Date Noted    Sepsis without acute organ dysfunction ([facility] [address] 04/18/2023    Type II diabetes mellitus ([facility] [address] 04/18/2023    Pleurodynia 04/18/2023    Atypical chest pain 04/18/2023    Hypertension 04/18/2023    Hyperglycemia 04/17/2023        The  provided the following Interventions:  Initiated a relationship of care and support. Listened empathically. Provided information about Spiritual Care Services. Chart reviewed. Assessment:  Patient does not have any Druze/cultural needs that will affect patient's preferences in health care. Plan:  Chaplains will continue to follow and will provide pastoral care on an as needed/requested basis.  recommends bedside caregivers page  on duty if patient shows signs of acute spiritual or emotional distress.     [address]   [phone]
0720: Bedside shift change report received from the out-going nurse. Patient in bed resting quietly. Patient noted to be tachycardic, and tachypneic with a HR in the 130s, Resp in the 30s, and O2 88-90%, PRN oxygen placed at 2L. 26: Patient assisted to use the urinal. HR went up to the 170s, and back to the 140 post urination. 0800: The echo tech at bedside to perform a TTE, but is unable due to the increased HR. The hospitalist (Dr Felicity Ball) was paged. Awaiting a call back. 0900: DARIA Quiñones (Cardiology) at bedside and was made aware of patient's tachycardia. No new orders received.
2D echo attempted, however HR up 140-150 bpm. Reattempt when HR is lower. Spoke with cardioglogy PA & RN to let them know will reattempt when HR is at a more diagnostic rate.
[address] [facility] Pharmacokinetic Monitoring Service - Vancomycin    Indication: SSTI  Goal AUC/ARLENE: 400-600 mg*hr/L  Day of Therapy: 4  Additional Antimicrobials: pip-tazo    Pertinent Laboratory Values: Wt Readings from Last 1 Encounters:   04/19/23 240 lb (108.9 kg)     Temp Readings from Last 1 Encounters:   04/20/23 98.1 °F (36.7 °C) (Oral)     No results found for: PROCAL  Estimated Creatinine Clearance: 172 mL/min (based on SCr of 0.75 mg/dL). Recent Labs     04/19/23  0130 04/20/23  0308   CREATININE 0.74 0.75   WBC 9.9 9.5     Pertinent Cultures:  Date Source Results   4/17 blood NGTD   4/18 urine NGF   MRSA Nasal Swab: N/A. Non-respiratory infection.     Assessment:  Date Current Dose Concentration (mg/L) Timing of Concentration (h) AUC   4/17 1,000 mg q1h x2 - - -   4/18 1,250 mg q12h  1,500 mg q12h - - -   4/19 1,500 mg q12h  1,500 mg q8h 7.6 8 385  577   4/20 1,500 mg q8h 7.3 9 518   Note: Serum concentrations collected for AUC dosing may appear elevated if collected in close proximity to the dose administered, this is not necessarily an indication of toxicity    Plan:  Continue current dose of 1,500 mg q8h  Level low because doses were given q12h despite being ordered q8h  No level ordered at this time  Pharmacy will continue to monitor patient and adjust therapy as indicated    Thank you for the consult,  Lisa Nicole [facility]  4/20/2023
[address] [facility] Pharmacokinetic Monitoring Service - Vancomycin    Indication: SSTI  Goal AUC/ARLENE: 400-600 mg*hr/L  Day of Therapy: 3  Additional Antimicrobials: pip-tazo    Pertinent Laboratory Values: Wt Readings from Last 1 Encounters:   04/17/23 240 lb (108.9 kg)     Temp Readings from Last 1 Encounters:   04/19/23 97.8 °F (36.6 °C) (Oral)     No results found for: PROCAL  Estimated Creatinine Clearance: 174 mL/min (based on SCr of 0.74 mg/dL). Recent Labs     04/17/23  1214 04/18/23  0607 04/19/23  0130   CREATININE 0.95  --  0.74   WBC 9.1 9.9 9.9     Pertinent Cultures:  Date Source Results   4/17 blood NGTD   4/18 urine pending   MRSA Nasal Swab: N/A. Non-respiratory infection.     Assessment:  Date Current Dose Concentration (mg/L) Timing of Concentration (h) AUC   4/17 1,000 mg q1h x2 - - -   4/18 1,250 mg q12h  1,500 mg q12h - - -   4/19 1,500 mg q12h  1,500 mg q8h 7.6 8 650 587   Note: Serum concentrations collected for AUC dosing may appear elevated if collected in close proximity to the dose administered, this is not necessarily an indication of toxicity    Plan:  Increase dose from 1,500 mg q12h to 1,500 mg q8h  Ordered a trough for 4/20 at [address] [facility] will continue to monitor patient and adjust therapy as indicated    Thank you for the consult,  MERVIN Gasca [facility]  4/19/2023
[address] [facility] Pharmacokinetic Monitoring Service - Vancomycin     Roberto Nava is a 41 y.o. male starting on vancomycin therapy for Skin and Soft Tissue Infection. Pharmacy consulted for monitoring and adjustment. Target Concentration: Goal AUC/ARLENE 400-600 mg*hr/L    Additional Antimicrobials: Piperacillin/Tazobactam    Pertinent Laboratory Values:   Temp: 97.8 °F (36.6 °C), Weight: 240 lb (108.9 kg)  Recent Labs     04/17/23  1214   CREATININE 0.95   BUN 14   WBC 9.1     Estimated Creatinine Clearance: 136 mL/min (based on SCr of 0.95 mg/dL). Pertinent Cultures:  Culture Date Source Results   04/17 Blood Pending   04/18 Urine Pending   MRSA Nasal Swab: N/A.  Non-respiratory infection    Plan:  Dosing recommendations based on Bayesian software  Start vancomycin 2000 mg IV x 1 followed by 1250 mg IV q12h  Anticipated AUC of 460 and trough concentration of 14.4 at steady state  Renal labs as indicated   Vancomycin concentration ordered for  04/20 @ 0400  Pharmacy will continue to monitor patient and adjust therapy as indicated    Thank you for the consult,  Giovanny Poon, [facility]  4/18/2023
Bedside shift change report given to GENNY Landry (oncoming nurse) by Paz Morrison RN (offgoing nurse). Report included the following information Nurse Handoff Report.
Bedside shift change report given to Mely Paz RN (oncoming nurse) by Lamonte Jackson RN (offgoing nurse). Report included the following information Nurse Handoff Report.
Bedside shift change report given to Sonia Radford RN (oncoming nurse) by Chinyere Wilhelm RN (offgoing nurse).  Report included the following information Nurse Handoff Report, Intake/Output, MAR, Recent Results, and Cardiac Rhythm ST .
[facility] - Progress Note    Admit Date: 4/17/2023  Attending Cardiologist: Dr. Alyssa Vega:     -Sinus tachycardia, suspect long standing. Rates continue to be in the 130s-140s despite BB tx.   -Chest pain, atypical. MI r/o. ECG sinus tachycardia with early repolarization, also noted on historical ECGs. -Sepsis, underlying infection r/o. -HTN, uncontrolled.   -DM2, new dx. HgbA1c 10.2.   -H/o Burkitt's Lymphoma, in remission   -Hypogonadism, on testosterone.   -Retinitis pigmentosa, blindness  -Paternal hx CAD   -Obesity        No Primary cardiologist, previously seen by Dr. Herminia Pablo (2011)     Plan:     -Will switch Coreg to lopressor 50 mg BID. Pressures are soft with rates in the 130s, will give one dose of IV digoxin now.   -Continue amlodipine.   -Recommend patient maintain BP journal.   -Continue statin.   -Patient to follow up with Dr. Genoveva Calzada as an outpatient, plan for outpatient nuclear stress testing.     -------------------------------------------------  Heart rate remains very between 120s 140 bpm appearing to be sinus tachycardia both by telemetry and EKG. Sepsis as well as PE has been ruled out. Patient however has a new diagnosis of diabetes with hemoglobin A1c 10.2. I discussed with family and patient. Plan to switch to Lopressor and patient can discharge and follow-up as an outpatient. Patient may have element of dysautonomia or inappropriate sinus tachycardia given diabetes. All questions answered. I saw, examined, and evaluated this patient and performed the substantive portion of the encounter for > 50% of the time including extensive history, physical exam, and medical decision making as discussed with patient and next-of-kin as needed. I personally reviewed the patient's labs, tests, vitals, orders, medications, updated history, and other providers assessments as well. I personally agree with the findings as stated and the plan as documented.   Leah Vazquez
[facility] - Progress Note    Admit Date: 4/17/2023  Attending Cardiologist: Dr. Nivia Ashton:     -Possible Sepsis, unclear source, possible perianal abscess? MRI pending.   -Chest pain, atypical. MI r/o. ECG sinus tachycardia with early repolarization, also noted on historical ECGs.   -HTN, uncontrolled.   -DM2, new dx. HgbA1c 10.2.   -H/o Burkitt's Lymphoma, in remission   -Hypogonadism, on testosterone.   -Retinitis pigmentosa, blindness  -Paternal hx CAD   -Obesity          No Primary cardiologist, previously seen by Dr. Alfonso Lawson (2011)     Plan:     -Echo to measure LVEF and assess valvular pathology pending.   -Hypertensive, Continued on amlodipine. Will start Coreg for BP and HR optimization. Would not aggressively treat elevated rates, which are likely physiologic.   -If no significant findings on Echo, will plan for a nuclear stress test as an outpatient.   -Continued ASA, statin. -MRI pending.   -Follow blood Cx.   -Further recommendations pending. Subjective:     No new complaints. Denies CP or SOB.      Objective:      Patient Vitals for the past 8 hrs:   Temp Pulse Resp BP SpO2   04/19/23 0800 97.8 °F (36.6 °C) (!) 131 23 (!) 152/93 97 %   04/19/23 0415 97.8 °F (36.6 °C) (!) 117 20 (!) 146/91 98 %         Patient Vitals for the past 96 hrs:   Weight   04/17/23 1201 240 lb (108.9 kg)       TELE:                Current Facility-Administered Medications   Medication Dose Route Frequency    vancomycin (VANCOCIN) 1500 mg in sodium chloride 0.9% 500 mL IVPB  1,500 mg IntraVENous q8h    [START ON 4/20/2023] vancomycin trough due 4/20 at 0500   Other Once    carvedilol (COREG) tablet 6.25 mg  6.25 mg Oral BID WC    glucose chewable tablet 16 g  4 tablet Oral PRN    dextrose bolus 10% 125 mL  125 mL IntraVENous PRN    Or    dextrose bolus 10% 250 mL  250 mL IntraVENous PRN    glucagon (rDNA) injection 1 mg  1 mg SubCUTAneous PRN    dextrose 10 % infusion   IntraVENous Continuous PRN
Comprehensive Nutrition Assessment    Type and Reason for Visit:  Initial, Patient Education    Nutrition Recommendations/Plan:   Continue current diet as tolerated. Continue to monitor tolerance of PO, weight, labs, and plan of care during admission. Malnutrition Assessment:  Malnutrition Status:  No malnutrition (04/19/23 0933)      Nutrition History and Allergies: PMHx: Burkitt's lymphoma (Dx April 2003) in remission, retinitis pigmentosa. Pt reports weighing 348 lb in 2015 but has been trying to lose wt and weighed 241 lb last month. Reports good intake/appetite PTA, no decrease in appetite. States mother usually prepares food for pt due to blindness, typically skips breakfast and likes fruit. NKFA. Nutrition Assessment:    Admitted for chest pain; Glucose in ED found to be 422 at highest. Patient without history of diabetes. Hgb A1c 10.2. Referred for cultural, Yazidi or ethnic food preferences. Pt has no food preferences. Visited pt with father and mother at bedside. RD provided diabetes education. Answered all questions. Nutrition Education:  Educated on diabetes  Learners: Patient and Family  Readiness: Eager  Method: Explanation, Handout, and Teachback  Response: Verbalizes Understanding  Contact name and number provided. Nutrition Related Findings:    Last BM (including prior to admit): 04/19/23  Edema: None    Pertinent Meds: lipitor, lovenox, lantus, humalog, protonix, zosyn, vanco, NS @ 150 ml/hr Pertinent Labs: POC Glucose 227-281 mg/dl x 24 hrs, Na 135 L; 4/17: Hgb A1C 10.2 H Wound Type: None       Current Nutrition Intake & Therapies:    Average Meal Intake: %  Average Supplements Intake: None Ordered  ADULT DIET; Regular; 3 carb choices (45 gm/meal)    Anthropometric Measures:  Height: 6' (182.9 cm)  Ideal Body Weight (IBW): 178 lbs (81 kg)    Admission Body Weight: 240 lb 1.3 oz (108.9 kg) (108.9 kg)  Current Body Weight: 240 lb 1.3 oz (108.9 kg), 134.9 % IBW.
Discharge order noted for today. Orders received. No needs identified at this time. Case management remains available as needed.     [facility] RN CDCES  Case Management
[facility]  (A Division of [address] [place] [address])      Progress Note      Date of Admission: 4/17/2023    Date of Note: 4/21/2023      Reason for Referral: Lactic acidosis and sepsis of unknown origin  Referring Physician: Dr. Yan Benoit from this admission:   4/17 blood cultures: No growth to date x2  4/17 influenza a and B antigen negative  4/17 rapid COVID: Negative  4/18 urine culture: negative    Current Antimicrobials:    Prior Antimicrobials:  Zosyn 4/17 to present  Vancomycin 4/17 to present        Assessment:         Possible sepsis: Tachycardia, lactic acidosis; thus far no documented fevers or hypotension. Chest x-ray without any acute pathology. CT of the chest abdomen and pelvis were otherwise negative with the exception of hepatic steatosis  Hyperglycemia with glucosuria due to diabetes mellitus type 2  Possible perianal abscess: At best this is resolving; on physical exam as it is difficult to appreciate  Hepatic steatosis-noted on 4/17 CT scan  History of Burkitt's lymphoma  History of blindness secondary to retinitis pigmentosa    Plan:   Work up for fevers and sepsis is unrevealing:   Echocardiogram reviewed- no reported valve vegetation  Lower extremity duplex - no evidence of DVT  MRI of the pelvis pending- tiny 3mm perianal fistula, no surrounding tissue swelling or drainage abscess. Follow-up 4/17 blood cultures- negative  Follow-up 4/18 urine culture- negative     Follow off Abx  Local wound care for small perianal fistula. Discussed with Dr. Bello Medicine for discharge from ID standpoint. Nicci Carcamo [facility]  [address], [address] [place] [address]  Office: [phone], Ext 8      Lines / Catheters:  Peripheral    Subjective:   Seen and examined. No new fevers. Doing about the same. No new fevers. No other complaints today. Still Tachycardic.      HPI:  Mr. Nikolas Beaver is a
[facility]  Progress Note    Patient: Lor Mcgill Age: 41 y.o. : 1985 MR#: [id number] SSN: [id number]  Date: 2023       Subjective/24-hour events:         Assessment:   SIRS  Suspected small, 3 mm perianal fistula by MRI  DM2 hyperglycemia, A1c 10.2%  Hyponatremia   Obesity, BMI 32.55  Blindness due to retinitis pigmentosa  History of Burkitt's lymphoma in remission    Plan:   Doubt that tiny fistula surgical intervention but will discuss with general surgery for opinion. Continue antibiotic therapy per ID. Lantus dose increased last night - monitor blood sugars today. Continue SSI. Further adjustments as necessary in the attempt to continue to optimize control. Case discussed with:  [x]Patient  [x]Family  [x] Nursing  [x]Case Management  DVT Prophylaxis:  [x]Lovenox  []Hep SQ  []SCDs  []Coumadin   []On Heparin gtt []PO anticoagulant    Objective:   VS: /80   Pulse (!) 134   Temp 98.1 °F (36.7 °C) (Oral)   Resp 22   Ht 6' (1.829 m)   Wt 240 lb (108.9 kg)   SpO2 95%   BMI 32.55 kg/m²      Tmax/24hrs: Temp (24hrs), Av.8 °F (36.6 °C), Min:97.6 °F (36.4 °C), Max:98.1 °F (36.7 °C)  No intake or output data in the 24 hours ending 23 0950    Gen: In NAD. Nontoxic-appearing. Lungs: Clear, no wheezes  Effort nonlabored. CV: RRR. Abdomen: Soft, NTTP. Extremities: Warm, no pitting edema or ischemia. Neuro:  Awake and alert, moves extremities spontaneously.     Current Facility-Administered Medications   Medication Dose Route Frequency    vancomycin (VANCOCIN) 1500 mg in sodium chloride 0.9% 500 mL IVPB  1,500 mg IntraVENous q8h    vancomycin trough due  at 0500   Other Once    carvedilol (COREG) tablet 6.25 mg  6.25 mg Oral BID WC    insulin glargine (LANTUS) injection vial 17 Units  17 Units SubCUTAneous Nightly    glucose chewable tablet 16 g  4 tablet Oral PRN    dextrose bolus 10% 125 mL  125 mL IntraVENous PRN    Or
INTERIM UPDATE - 9685 EST on 4/20/2023    Patient noted to have Heart Rate 140s to 150 bpm.  Review of Telemetry indicates that it is sinus tachycardia. Plan:  STAT Lactic Acid.
[facility]  (A Division of [address] [place] [address])      Progress Note      Date of Admission: 4/17/2023    Date of Note: 4/19/2023      Reason for Referral: Lactic acidosis and sepsis of unknown origin  Referring Physician: Dr. Polly Christensen from this admission:   4/17 blood cultures: No growth to date x2  4/17 influenza a and B antigen negative  4/17 rapid COVID: Negative  4/18 urine culture: In process    Current Antimicrobials:    Prior Antimicrobials:  Zosyn 4/17 to present  Vancomycin 4/17 to present        Assessment:         Possible sepsis: Tachycardia, lactic acidosis; thus far no documented fevers or hypotension. Chest x-ray without any acute pathology. CT of the chest abdomen and pelvis were otherwise negative with the exception of hepatic steatosis  Hyperglycemia with glucosuria due to diabetes mellitus type 2  Possible perianal abscess: At best this is resolving; on physical exam as it is difficult to appreciate  Hepatic steatosis-noted on 4/17 CT scan  History of Burkitt's lymphoma  History of blindness secondary to retinitis pigmentosa    Plan:   Echocardiogram pending  Lower extremity duplex pending  MRI of the pelvis pending- patient refused yesterday  Follow-up 4/17 blood cultures  Follow-up 4/18 urine culture    For now continue with Zosyn and vancomycin. Will continue to adjust over the next 24 to 48 hours based on clinical findings and changes. Discussed with Dr. Odette De Anda, [facility]  [address], [address] [facility]  Office: [phone], Ext 8      Lines / Catheters:  Peripheral    Subjective:   Seen and examined. No new fevers. Still feels generally unwell but no other specific symptoms.  Didn't get MRI yesterday due to anxiety- trying again today with ativan    HPI:  Mr. Wily Christine is a 36-year-old gentleman with a past medical history of Burkitt's lymphoma currently in
MRI called. Patient refusing MRI without sedation,offered potential ativan, however MRI staff cannot wait for patient to become sedated to receive the scan due to availability.  They will attempt in the AM.
MRI screening form needs to be filled out and faxed to [phone] BEFORE MRI can be scheduled. If unable to obtain information from patient , [facility] needs to be contacted .  If patient is claustrophobic or will needs pain meds, please have ordered in advance in order to facilitate exam.
[facility]  (A Division of [address] [place] [address])      Progress Note      Date of Admission: 4/17/2023    Date of Note: 4/20/2023      Reason for Referral: Lactic acidosis and sepsis of unknown origin  Referring Physician: Dr. Priscilla Bowman from this admission:   4/17 blood cultures: No growth to date x2  4/17 influenza a and B antigen negative  4/17 rapid COVID: Negative  4/18 urine culture: negative    Current Antimicrobials:    Prior Antimicrobials:  Zosyn 4/17 to present  Vancomycin 4/17 to present        Assessment:         Possible sepsis: Tachycardia, lactic acidosis; thus far no documented fevers or hypotension. Chest x-ray without any acute pathology. CT of the chest abdomen and pelvis were otherwise negative with the exception of hepatic steatosis  Hyperglycemia with glucosuria due to diabetes mellitus type 2  Possible perianal abscess: At best this is resolving; on physical exam as it is difficult to appreciate  Hepatic steatosis-noted on 4/17 CT scan  History of Burkitt's lymphoma  History of blindness secondary to retinitis pigmentosa    Plan:   Echocardiogram reviewed- no reported valve vegetation  Lower extremity duplex - no evidence of DVT  MRI of the pelvis pending- tiny 3mm perianal fistula, no surrounding tissue swelling or drainage abscess. Follow-up 4/17 blood cultures- negative  Follow-up 4/18 urine culture- negative    D/c vancomycin and zosyn. Follow off Abx  Local wound care for small perianal fistula. Discussed with Dr. Ray Collet for discharge from ID standpoint. Hever Frias DO  [facility]  [address], [address] [place] [address]  Office: [phone], Ext 8      Lines / Catheters:  Peripheral    Subjective:   Seen and examined. No new fevers. Doing about the same. No new fevers. No other complaints today.      HPI:  Mr. Apolonia Maynard is a 36-year-old gentleman with a past medical history
PT is A&Ox4. IV and BSM d/c, belongings packed up and changed into personal clothes. Discussed discharge and diabetes education with patient and family. Pt went home with family.
PT order received and chart reviewed. Patient not seen due to tachycardia. Heart rate 140's to 150's after getting back to the bed following BSC transfer with nursing . Will continue to follow.    Becki Fagan PT, DPT
Patient just arrived the unit on a stretcher via medical transport.
Patient persistently tachy with HR in the 130s and up to 145 and a MEWS of 3-5. Patient reports feeling fine. Dr Rebeka Rg was made aware. No new orders at this time.
Pharmacist Review and Automatic Dose Adjustment of Prophylactic Enoxaparin    *Review reason for admission/hospital problem list*    The reviewing pharmacist has made an adjustment to the ordered enoxaparin dose or converted to UFH per the approved [facility] protocol and table as identified below. Janina Johnson is a 41 y.o. male. Recent Labs     04/17/23  1214   CREATININE 0.95       Estimated Creatinine Clearance: 136 mL/min (based on SCr of 0.95 mg/dL).     Height:   Ht Readings from Last 1 Encounters:   04/17/23 6' (1.829 m)     Weight:  Wt Readings from Last 1 Encounters:   04/17/23 240 lb (108.9 kg)                   Estimated CrCl  (ml/min) Patient Weight (kg)     50.9and below .9 101-150.9 151-174.9 175 or greater    30 or greater 30 mg SUBQ daily 40 mg SUBQ daily (or 30mg BID for orthopedic cases) 30 mg SUBQ BID 40 mg SUBQ BID 60mg SUBQ BID    15-29.9 UFH 5000 units SUBQ BID 30 mg SUBQ daily 30 mg SUBQ daily 40 mg SUBQ daily 60 mg SUBQ daily    Less than 15or dialysis UFH 5000 units SUBQ BID UFH 5000 units SUBQ TID UFH 7500 units SUBQ TID        Plan: Based upon the patient's weight and renal function, the ordered enoxaparin dose of 40mg daily has been changed/converted to 30 mg BID      Thank you,  Lashawn Jesus, [facility]
Granulocytes 1 (H) 0.0 - 0.5 %    Segs Absolute 6.5 1.8 - 8.0 K/UL    Absolute Lymph # 2.3 0.9 - 3.6 K/UL    Absolute Mono # 0.8 0.05 - 1.2 K/UL    Absolute Eos # 0.2 0.0 - 0.4 K/UL    Basophils Absolute 0.1 0.0 - 0.1 K/UL    Absolute Immature Granulocyte 0.1 (H) 0.00 - 0.04 K/UL    Differential Type AUTOMATED     Comprehensive Metabolic Panel    Collection Time: 04/19/23  1:30 AM   Result Value Ref Range    Sodium 135 (L) 136 - 145 mmol/L    Potassium 3.7 3.5 - 5.5 mmol/L    Chloride 105 100 - 111 mmol/L    CO2 24 21 - 32 mmol/L    Anion Gap 6 3.0 - 18 mmol/L    Glucose 255 (H) 74 - 99 mg/dL    BUN 11 7.0 - 18 MG/DL    Creatinine 0.74 0.6 - 1.3 MG/DL    Bun/Cre Ratio 15 12 - 20      Est, Glom Filt Rate >60 >60 ml/min/1.73m2    Calcium 8.7 8.5 - 10.1 MG/DL    Total Bilirubin 0.7 0.2 - 1.0 MG/DL    ALT 49 16 - 61 U/L    AST 17 10 - 38 U/L    Alk Phosphatase 134 (H) 45 - 117 U/L    Total Protein 6.4 6.4 - 8.2 g/dL    Albumin 3.1 (L) 3.4 - 5.0 g/dL    Globulin 3.3 2.0 - 4.0 g/dL    Albumin/Globulin Ratio 0.9 0.8 - 1.7     Vancomycin Level, Random    Collection Time: 04/19/23  1:30 AM   Result Value Ref Range    Vancomycin Rm 7.6 5.0 - 40.0 UG/ML   POCT Glucose    Collection Time: 04/19/23  7:54 AM   Result Value Ref Range    POC Glucose 227 (H) 70 - 110 mg/dL   Transthoracic echocardiogram (TTE) complete with contrast, bubble, strain, and 3D PRN    Collection Time: 04/19/23 10:59 AM   Result Value Ref Range    IVSd 1.3 (A) 0.6 - 1.0 cm    LVIDd 3.8 (A) 4.2 - 5.9 cm    LVIDs 2.9 cm    LVOT Diameter 2.3 cm    LVPWd 1.3 (A) 0.6 - 1.0 cm    EF BP 54 (A) 55 - 100 %    LV Ejection Fraction A2C 54 %    LV Ejection Fraction A4C 52 %    LV EDV A2C 99 mL    LV EDV A4C 100 mL    LV EDV  67 - 155 mL    LV ESV A2C 45 mL    LV ESV A4C 49 mL    LV ESV BP 49 22 - 58 mL    LVOT Peak Gradient 4 mmHg    LVOT Mean Gradient 2 mmHg    LVOT SV 75.6 ml    LVOT Peak Velocity 1.0 m/s    LVOT VTI 18.2 cm    RV Free Wall Peak S' 20 cm/s
(EUA) for use by authorized laboratories. Fact sheet for Healthcare Providers:  http://www.shira.sharlene/  Fact sheet for Patients: http://www.shira.sharlene/       Methodology: Isothermal Nucleic Acid Amplification         Rapid influenza A/B antigens [[id number]] Collected: 04/17/23 1225    Order Status: Completed Specimen: Nasal Washing Updated: 04/17/23 1509     Influenza A Ag Negative        Comment: A negative result does not exclude influenza virus infection, seasonal or H1N1 due to suboptimal sensitivity. If influenza is circulating in your community, a diagnosis of influenza should be considered based on a patients clinical presentation and empiric antiviral treatment should be considered, if indicated. Influenza B Ag Negative       Culture, Blood 1 [[id number]] Collected: 04/17/23 1224    Order Status: Completed Specimen: Blood Updated: 04/18/23 0653     Special Requests NO SPECIAL REQUESTS        Culture NO GROWTH AFTER 15 HOURS                   Images:    CT (Most Recent).       XRAY (Most Recent)      EKG @LASTPROCAMB([id number])@     2D ECHO

## 2023-04-21 NOTE — PLAN OF CARE
Problem: Discharge Planning  Goal: Discharge to home or other facility with appropriate resources  Outcome: Progressing  Flowsheets  Taken 4/19/2023 0855 by Tasha Vanegas RN  Discharge to home or other facility with appropriate resources: Identify barriers to discharge with patient and caregiver  Taken 4/18/2023 1945 by Kwan Sharp RN  Discharge to home or other facility with appropriate resources: Identify barriers to discharge with patient and caregiver     Problem: Safety - Adult  Goal: Free from fall injury  Outcome: Progressing     Problem: Chronic Conditions and Co-morbidities  Goal: Patient's chronic conditions and co-morbidity symptoms are monitored and maintained or improved  Outcome: Progressing  Flowsheets  Taken 4/19/2023 0855 by Mookie Comer 34 - Patient's Chronic Conditions and Co-Morbidity Symptoms are Monitored and Maintained or Improved: Monitor and assess patient's chronic conditions and comorbid symptoms for stability, deterioration, or improvement  Taken 4/18/2023 1945 by Kwan Sharp RN  Care Plan - Patient's Chronic Conditions and Co-Morbidity Symptoms are Monitored and Maintained or Improved: Monitor and assess patient's chronic conditions and comorbid symptoms for stability, deterioration, or improvement     Problem: Skin/Tissue Integrity  Goal: Absence of new skin breakdown  Description: 1. Monitor for areas of redness and/or skin breakdown  2. Assess vascular access sites hourly  3. Every 4-6 hours minimum:  Change oxygen saturation probe site  4. Every 4-6 hours:  If on nasal continuous positive airway pressure, respiratory therapy assess nares and determine need for appliance change or resting period.   Outcome: Progressing
Problem: Discharge Planning  Goal: Discharge to home or other facility with appropriate resources  Outcome: Progressing  Flowsheets (Taken 4/18/2023 0724)  Discharge to home or other facility with appropriate resources: Identify barriers to discharge with patient and caregiver     Problem: Safety - Adult  Goal: Free from fall injury  Outcome: Progressing     Problem: Chronic Conditions and Co-morbidities  Goal: Patient's chronic conditions and co-morbidity symptoms are monitored and maintained or improved  Outcome: Progressing  Flowsheets (Taken 4/18/2023 0724)  Care Plan - Patient's Chronic Conditions and Co-Morbidity Symptoms are Monitored and Maintained or Improved: Monitor and assess patient's chronic conditions and comorbid symptoms for stability, deterioration, or improvement
Problem: Discharge Planning  Goal: Discharge to home or other facility with appropriate resources  Outcome: Progressing  Flowsheets (Taken 4/20/2023 0859)  Discharge to home or other facility with appropriate resources: Identify barriers to discharge with patient and caregiver     Problem: Safety - Adult  Goal: Free from fall injury  Outcome: Progressing     Problem: Chronic Conditions and Co-morbidities  Goal: Patient's chronic conditions and co-morbidity symptoms are monitored and maintained or improved  Outcome: Progressing  Flowsheets (Taken 4/20/2023 0859)  Care Plan - Patient's Chronic Conditions and Co-Morbidity Symptoms are Monitored and Maintained or Improved: Monitor and assess patient's chronic conditions and comorbid symptoms for stability, deterioration, or improvement     Problem: Skin/Tissue Integrity  Goal: Absence of new skin breakdown  Description: 1. Monitor for areas of redness and/or skin breakdown  2. Assess vascular access sites hourly  3. Every 4-6 hours minimum:  Change oxygen saturation probe site  4. Every 4-6 hours:  If on nasal continuous positive airway pressure, respiratory therapy assess nares and determine need for appliance change or resting period.   Outcome: Progressing     Problem: Nutrition Deficit:  Goal: Optimize nutritional status  Outcome: Progressing
mikey and determine need for appliance change or resting period.   4/19/2023 2152 by Robert Richard RN  Outcome: Progressing  4/19/2023 0857 by Juan J Moore RN  Outcome: Progressing     Problem: Nutrition Deficit:  Goal: Optimize nutritional status  Outcome: Progressing
Patient left in no apparent distress sitting up in chair  [x]         Patient left in no apparent distress in bed  [x]         Call bell left within reach  [x]         Nursing notified  []         Caregiver present  []         Bed alarm activated  []         SCDs applied    COMMUNICATION/EDUCATION:   Patient Education  Education Given To: Patient  Education Provided: Role of Therapy;Plan of Care;Energy Conservation  Education Method: Verbal;Teach Back  Barriers to Learning: None  Education Outcome: Verbalized understanding    Thank you for this referral.  Lindsey Benton, PT  Minutes: 19      Eval Complexity: [facility] [facility] [place] AM-PAC® Basic Mobility Inpatient Short Form (6-Clicks) Version 2    How much HELP from another person does the patient currently need    (If the patient hasn't done an activity recently, how much help from another person do you think he/she would need if he/she tried?)   Total (Total A or Dep)   A Lot  (Mod to Max A)   A Little (Sup or Min A)   None (Mod I to I)   Turning from your back to your side while in a flat bed without using bedrails? [] 1 [] 2 [x] 3 [] 4   2. Moving from lying on your back to sitting on the side of a flat bed without using bedrails? [] 1 [] 2 [x] 3 [] 4   3. Moving to and from a bed to a chair (including a wheelchair)? [] 1 [] 2 [x] 3 [] 4   4. Standing up from a chair using your arms (e.g., wheelchair, or bedside chair)? [] 1 [] 2 [x] 3 [] 4   5. Walking in hospital room? [] 1 [] 2 [x] 3 [] 4   6. Climbing 3-5 steps with a railing?+   [] 1 [] 2 [x] 3 [] 4   +If stair climbing cannot be assessed, skip item #6. Sum responses from items 1-5.
4   2. Bathing (including washing, rinsing,      drying)? [] 1 [] 2 [x] 3 [] 4   3. Toileting, which includes using toilet, bedpan or urinal?   [] 1 [] 2 [x] 3 [] 4   4. Putting on and taking off regular upper body clothing? [] 1 [] 2 [x] 3 [] 4   5. Taking care of personal grooming such as brushing teeth? [] 1 [] 2 [] 3 [x] 4   6. Eating meals?    [] 1 [] 2 [] 3 [x] 4

## 2023-04-23 LAB
BACTERIA SPEC CULT: NORMAL
BACTERIA SPEC CULT: NORMAL
SERVICE CMNT-IMP: NORMAL
SERVICE CMNT-IMP: NORMAL

## 2023-05-17 DIAGNOSIS — I10 HYPERTENSION, UNSPECIFIED TYPE: ICD-10-CM

## 2023-05-17 DIAGNOSIS — R00.0 SINUS TACHYCARDIA: ICD-10-CM

## 2023-05-17 DIAGNOSIS — E11.69 TYPE 2 DIABETES MELLITUS WITH OTHER SPECIFIED COMPLICATION, WITHOUT LONG-TERM CURRENT USE OF INSULIN (HCC): ICD-10-CM

## 2023-05-17 RX ORDER — AMLODIPINE BESYLATE 10 MG/1
10 TABLET ORAL DAILY
Qty: 30 TABLET | Refills: 3 | Status: SHIPPED | OUTPATIENT
Start: 2023-05-17

## 2023-05-17 RX ORDER — METOPROLOL TARTRATE 50 MG/1
50 TABLET, FILM COATED ORAL 2 TIMES DAILY
Qty: 60 TABLET | Refills: 3 | Status: SHIPPED | OUTPATIENT
Start: 2023-05-17

## 2023-05-17 RX ORDER — ASPIRIN 81 MG/1
81 TABLET, CHEWABLE ORAL DAILY
Qty: 30 TABLET | Refills: 3 | Status: SHIPPED | OUTPATIENT
Start: 2023-05-17

## 2023-05-19 ENCOUNTER — APPOINTMENT (OUTPATIENT)
Facility: HOSPITAL | Age: 38
End: 2023-05-19
Payer: COMMERCIAL

## 2023-05-19 ENCOUNTER — HOSPITAL ENCOUNTER (EMERGENCY)
Facility: HOSPITAL | Age: 38
Discharge: HOME | End: 2023-05-19
Attending: STUDENT IN AN ORGANIZED HEALTH CARE EDUCATION/TRAINING PROGRAM
Payer: COMMERCIAL

## 2023-05-19 VITALS
HEIGHT: 72 IN | SYSTOLIC BLOOD PRESSURE: 122 MMHG | OXYGEN SATURATION: 100 % | HEART RATE: 99 BPM | BODY MASS INDEX: 31.15 KG/M2 | DIASTOLIC BLOOD PRESSURE: 80 MMHG | RESPIRATION RATE: 18 BRPM | WEIGHT: 230 LBS | TEMPERATURE: 98.3 F

## 2023-05-19 DIAGNOSIS — R07.89 OTHER CHEST PAIN: Primary | ICD-10-CM

## 2023-05-19 LAB
ALBUMIN SERPL-MCNC: 3.6 G/DL (ref 3.4–5)
ALBUMIN/GLOB SERPL: 1.1 (ref 0.8–1.7)
ALP SERPL-CCNC: 140 U/L (ref 45–117)
ALT SERPL-CCNC: 44 U/L (ref 16–61)
ANION GAP SERPL CALC-SCNC: 5 MMOL/L (ref 3–18)
AST SERPL-CCNC: 30 U/L (ref 10–38)
BASOPHILS # BLD: 0.1 K/UL (ref 0–0.1)
BASOPHILS NFR BLD: 1 % (ref 0–2)
BILIRUB SERPL-MCNC: 0.7 MG/DL (ref 0.2–1)
BUN SERPL-MCNC: 14 MG/DL (ref 7–18)
BUN/CREAT SERPL: 18 (ref 12–20)
CALCIUM SERPL-MCNC: 8.9 MG/DL (ref 8.5–10.1)
CHLORIDE SERPL-SCNC: 105 MMOL/L (ref 100–111)
CO2 SERPL-SCNC: 28 MMOL/L (ref 21–32)
CREAT SERPL-MCNC: 0.79 MG/DL (ref 0.6–1.3)
D DIMER PPP FEU-MCNC: 0.42 UG/ML(FEU)
DIFFERENTIAL METHOD BLD: ABNORMAL
EKG ATRIAL RATE: 104 BPM
EKG ATRIAL RATE: 107 BPM
EKG ATRIAL RATE: 108 BPM
EKG ATRIAL RATE: 93 BPM
EKG DIAGNOSIS: NORMAL
EKG P AXIS: 55 DEGREES
EKG P AXIS: 56 DEGREES
EKG P AXIS: 59 DEGREES
EKG P AXIS: 66 DEGREES
EKG P-R INTERVAL: 130 MS
EKG P-R INTERVAL: 156 MS
EKG P-R INTERVAL: 158 MS
EKG P-R INTERVAL: 162 MS
EKG Q-T INTERVAL: 336 MS
EKG Q-T INTERVAL: 336 MS
EKG Q-T INTERVAL: 338 MS
EKG Q-T INTERVAL: 350 MS
EKG QRS DURATION: 84 MS
EKG QRS DURATION: 84 MS
EKG QRS DURATION: 86 MS
EKG QRS DURATION: 92 MS
EKG QTC CALCULATION (BAZETT): 435 MS
EKG QTC CALCULATION (BAZETT): 444 MS
EKG QTC CALCULATION (BAZETT): 448 MS
EKG QTC CALCULATION (BAZETT): 450 MS
EKG R AXIS: 83 DEGREES
EKG R AXIS: 87 DEGREES
EKG R AXIS: 89 DEGREES
EKG R AXIS: 89 DEGREES
EKG T AXIS: 54 DEGREES
EKG T AXIS: 59 DEGREES
EKG T AXIS: 62 DEGREES
EKG T AXIS: 63 DEGREES
EKG VENTRICULAR RATE: 104 BPM
EKG VENTRICULAR RATE: 107 BPM
EKG VENTRICULAR RATE: 108 BPM
EKG VENTRICULAR RATE: 93 BPM
EOSINOPHIL # BLD: 0.2 K/UL (ref 0–0.4)
EOSINOPHIL NFR BLD: 2 % (ref 0–5)
ERYTHROCYTE [DISTWIDTH] IN BLOOD BY AUTOMATED COUNT: 12.7 % (ref 11.6–14.5)
GLOBULIN SER CALC-MCNC: 3.4 G/DL (ref 2–4)
GLUCOSE BLD STRIP.AUTO-MCNC: 189 MG/DL (ref 70–110)
GLUCOSE SERPL-MCNC: 205 MG/DL (ref 74–99)
HCT VFR BLD AUTO: 42.6 % (ref 36–48)
HGB BLD-MCNC: 14.4 G/DL (ref 13–16)
IMM GRANULOCYTES # BLD AUTO: 0.1 K/UL (ref 0–0.04)
IMM GRANULOCYTES NFR BLD AUTO: 1 % (ref 0–0.5)
LYMPHOCYTES # BLD: 2.5 K/UL (ref 0.9–3.6)
LYMPHOCYTES NFR BLD: 26 % (ref 21–52)
MAGNESIUM SERPL-MCNC: 2.1 MG/DL (ref 1.6–2.6)
MCH RBC QN AUTO: 27.1 PG (ref 24–34)
MCHC RBC AUTO-ENTMCNC: 33.8 G/DL (ref 31–37)
MCV RBC AUTO: 80.1 FL (ref 78–100)
MONOCYTES # BLD: 0.7 K/UL (ref 0.05–1.2)
MONOCYTES NFR BLD: 8 % (ref 3–10)
NEUTS SEG # BLD: 6 K/UL (ref 1.8–8)
NEUTS SEG NFR BLD: 63 % (ref 40–73)
NRBC # BLD: 0 K/UL (ref 0–0.01)
NRBC BLD-RTO: 0 PER 100 WBC
NT PRO BNP: 94 PG/ML (ref 0–450)
PLATELET # BLD AUTO: 244 K/UL (ref 135–420)
PMV BLD AUTO: 10.2 FL (ref 9.2–11.8)
POTASSIUM SERPL-SCNC: 4.4 MMOL/L (ref 3.5–5.5)
PROT SERPL-MCNC: 7 G/DL (ref 6.4–8.2)
RBC # BLD AUTO: 5.32 M/UL (ref 4.35–5.65)
SODIUM SERPL-SCNC: 138 MMOL/L (ref 136–145)
TROPONIN I SERPL HS-MCNC: 6 NG/L (ref 0–78)
TROPONIN I SERPL HS-MCNC: 7 NG/L (ref 0–78)
WBC # BLD AUTO: 9.6 K/UL (ref 4.6–13.2)

## 2023-05-19 PROCEDURE — 84484 ASSAY OF TROPONIN QUANT: CPT

## 2023-05-19 PROCEDURE — 83880 ASSAY OF NATRIURETIC PEPTIDE: CPT

## 2023-05-19 PROCEDURE — 85379 FIBRIN DEGRADATION QUANT: CPT

## 2023-05-19 PROCEDURE — 93010 ELECTROCARDIOGRAM REPORT: CPT | Performed by: INTERNAL MEDICINE

## 2023-05-19 PROCEDURE — 82962 GLUCOSE BLOOD TEST: CPT

## 2023-05-19 PROCEDURE — 71046 X-RAY EXAM CHEST 2 VIEWS: CPT

## 2023-05-19 PROCEDURE — 85025 COMPLETE CBC W/AUTO DIFF WBC: CPT

## 2023-05-19 PROCEDURE — 93005 ELECTROCARDIOGRAM TRACING: CPT | Performed by: STUDENT IN AN ORGANIZED HEALTH CARE EDUCATION/TRAINING PROGRAM

## 2023-05-19 PROCEDURE — 83735 ASSAY OF MAGNESIUM: CPT

## 2023-05-19 PROCEDURE — 93005 ELECTROCARDIOGRAM TRACING: CPT | Performed by: PHYSICIAN ASSISTANT

## 2023-05-19 PROCEDURE — 80053 COMPREHEN METABOLIC PANEL: CPT

## 2023-05-19 PROCEDURE — 99285 EMERGENCY DEPT VISIT HI MDM: CPT

## 2023-05-19 ASSESSMENT — ENCOUNTER SYMPTOMS
ABDOMINAL PAIN: 0
DIARRHEA: 0
NAUSEA: 0
SHORTNESS OF BREATH: 0
VOMITING: 0

## 2023-05-19 ASSESSMENT — PAIN - FUNCTIONAL ASSESSMENT: PAIN_FUNCTIONAL_ASSESSMENT: NONE - DENIES PAIN

## 2023-05-19 NOTE — ED NOTES
Discharge instructions reviewed with patient and family. Patient and family verbalized understanding. Patient and family advised to follow up as directed on discharge instructions. Patient and family deny questions, needs or concerns at this time. Patient and family verbalized understanding. No s/sx of distress noted.        Ronak Cohne RN  05/19/23 5476

## 2023-05-19 NOTE — DISCHARGE INSTRUCTIONS
Continue to take medication as prescribed. Follow-up with your primary care physician within 2 days for reassessment. Bring the results from this visit with you for their review. Return to the ED immediately for any new, worsening, or persistent symptoms, including fever, chest pain, shortness of breath, or any other medical concerns.

## 2023-05-19 NOTE — ED PROVIDER NOTES
sounds. Abdominal:      General: Abdomen is flat. There is no distension. Palpations: Abdomen is soft. Tenderness: There is no abdominal tenderness. There is no guarding or rebound. Musculoskeletal:         General: Normal range of motion. Cervical back: Normal range of motion and neck supple. Skin:     General: Skin is warm. Findings: No rash. Neurological:      General: No focal deficit present. Mental Status: He is alert and oriented to person, place, and time. Cranial Nerves: No cranial nerve deficit. Sensory: No sensory deficit. Motor: No weakness.          Diagnostic Study Results     Labs -  Recent Results (from the past 12 hour(s))   EKG 12 Lead    Collection Time: 05/19/23 10:59 AM   Result Value Ref Range    Ventricular Rate 107 BPM    Atrial Rate 107 BPM    P-R Interval 156 ms    QRS Duration 84 ms    Q-T Interval 336 ms    QTc Calculation (Bazett) 448 ms    P Axis 59 degrees    R Axis 83 degrees    T Axis 54 degrees    Diagnosis       Sinus tachycardia  Early repolarization  Otherwise normal ECG  When compared with ECG of 18-APR-2023 05:27,  No significant change was found     CBC with Auto Differential    Collection Time: 05/19/23 11:07 AM   Result Value Ref Range    WBC 9.6 4.6 - 13.2 K/uL    RBC 5.32 4.35 - 5.65 M/uL    Hemoglobin 14.4 13.0 - 16.0 g/dL    Hematocrit 42.6 36.0 - 48.0 %    MCV 80.1 78.0 - 100.0 FL    MCH 27.1 24.0 - 34.0 PG    MCHC 33.8 31.0 - 37.0 g/dL    RDW 12.7 11.6 - 14.5 %    Platelets 177 089 - 367 K/uL    MPV 10.2 9.2 - 11.8 FL    Nucleated RBCs 0.0 0  WBC    nRBC 0.00 0.00 - 0.01 K/uL    Neutrophils % 63 40 - 73 %    Lymphocytes % 26 21 - 52 %    Monocytes % 8 3 - 10 %    Eosinophils % 2 0 - 5 %    Basophils % 1 0 - 2 %    Immature Granulocytes 1 (H) 0.0 - 0.5 %    Neutrophils Absolute 6.0 1.8 - 8.0 K/UL    Lymphocytes Absolute 2.5 0.9 - 3.6 K/UL    Monocytes Absolute 0.7 0.05 - 1.2 K/UL    Eosinophils Absolute 0.2 0.0 - 0.4

## 2023-05-29 PROBLEM — R00.0 TACHYCARDIA: Status: ACTIVE | Noted: 2023-05-29

## 2023-06-01 ENCOUNTER — OFFICE VISIT (OUTPATIENT)
Age: 38
End: 2023-06-01

## 2023-06-01 VITALS
HEART RATE: 100 BPM | SYSTOLIC BLOOD PRESSURE: 130 MMHG | WEIGHT: 234 LBS | DIASTOLIC BLOOD PRESSURE: 74 MMHG | BODY MASS INDEX: 31.69 KG/M2 | OXYGEN SATURATION: 98 % | HEIGHT: 72 IN

## 2023-06-01 DIAGNOSIS — R07.9 CHEST PAIN, UNSPECIFIED TYPE: Primary | ICD-10-CM

## 2023-06-01 RX ORDER — PIOGLITAZONEHYDROCHLORIDE 30 MG/1
30 TABLET ORAL EVERY MORNING
COMMUNITY
Start: 2023-05-16

## 2023-06-01 RX ORDER — METFORMIN HYDROCHLORIDE 500 MG/1
500 TABLET, EXTENDED RELEASE ORAL 2 TIMES DAILY
COMMUNITY
Start: 2023-05-17

## 2023-06-01 RX ORDER — TESTOSTERONE 20.25 MG/1.25G
GEL TOPICAL
COMMUNITY
Start: 2023-05-16

## 2023-06-01 NOTE — PROGRESS NOTES
History of Present Illness:  41 YOM here for follow up. He was discharged from the hospital in mid April and seen initially for sinus tachycardia that was longstanding. He was switched from Coreg to Lopressor. EF was noted to be normal.  He has a history of longstanding diabetes, as well as remote Burkitt lymphoma, in remission. He also has chronic blindness and hypogonadism. He was just in the ER with some chest pain a couple weeks ago with normal troponin. It has not recurred. He is accompanied by family today. Impression:  Recurrent atypical chest pain with multiple risk factors, plan pharmacologic cardiac nuclear stress test.  Recent sinus tachycardia, likely physiologic, doing well on Lopressor. Heart rate is 100 bpm today. Blood pressure has been stable at home. History of HTN. Diabetes with recent new diagnosis. Hemoglobin A1c was 10.2. Also started on statin. History of retinitis pigmentosa with chronic blindness. History of hypogonadism, on testosterone. Plan:  Given his multiple risk factors and recent diagnosis of diabetes I am going to plan pharmacologic cardiac nuclear stress test.  I suspect a fair amount of sinus tachycardia was physiologic due to his recent diagnosis of diabetes. He is going to follow up with Dr. Brittany Luo from endocrinology. All questions answered and as long as his testing is unremarkable, I will see back in six months.         Wt Readings from Last 3 Encounters:   06/01/23 234 lb (106.1 kg)   05/19/23 230 lb (104.3 kg)   04/19/23 240 lb (108.9 kg)     Past Medical History:   Diagnosis Date    Blind        Current Outpatient Medications   Medication Sig Dispense Refill    metoprolol tartrate (LOPRESSOR) 50 MG tablet Take 1 tablet by mouth 2 times daily 60 tablet 3    aspirin 81 MG chewable tablet Take 1 tablet by mouth daily 30 tablet 3    amLODIPine (NORVASC) 10 MG tablet Take 1 tablet by mouth daily 30 tablet 3    blood glucose monitor strips Test two times a

## 2023-06-09 ENCOUNTER — HOSPITAL ENCOUNTER (EMERGENCY)
Facility: HOSPITAL | Age: 38
Discharge: HOME | End: 2023-06-09
Attending: EMERGENCY MEDICINE
Payer: COMMERCIAL

## 2023-06-09 ENCOUNTER — APPOINTMENT (OUTPATIENT)
Facility: HOSPITAL | Age: 38
End: 2023-06-09
Payer: COMMERCIAL

## 2023-06-09 VITALS
HEIGHT: 72 IN | OXYGEN SATURATION: 97 % | WEIGHT: 234 LBS | TEMPERATURE: 98 F | SYSTOLIC BLOOD PRESSURE: 122 MMHG | DIASTOLIC BLOOD PRESSURE: 74 MMHG | BODY MASS INDEX: 31.69 KG/M2 | HEART RATE: 96 BPM | RESPIRATION RATE: 23 BRPM

## 2023-06-09 DIAGNOSIS — R00.2 PALPITATIONS: ICD-10-CM

## 2023-06-09 DIAGNOSIS — R00.0 TACHYCARDIA: ICD-10-CM

## 2023-06-09 DIAGNOSIS — R07.9 CHEST PAIN, UNSPECIFIED TYPE: Primary | ICD-10-CM

## 2023-06-09 LAB
ALBUMIN SERPL-MCNC: 3.7 G/DL (ref 3.4–5)
ALBUMIN/GLOB SERPL: 0.8 (ref 0.8–1.7)
ALP SERPL-CCNC: 145 U/L (ref 45–117)
ALT SERPL-CCNC: 29 U/L (ref 16–61)
ANION GAP SERPL CALC-SCNC: 6 MMOL/L (ref 3–18)
AST SERPL-CCNC: 14 U/L (ref 10–38)
BASOPHILS # BLD: 0.1 K/UL (ref 0–0.1)
BASOPHILS NFR BLD: 1 % (ref 0–2)
BILIRUB SERPL-MCNC: 0.5 MG/DL (ref 0.2–1)
BUN SERPL-MCNC: 17 MG/DL (ref 7–18)
BUN/CREAT SERPL: 17 (ref 12–20)
CALCIUM SERPL-MCNC: 9.1 MG/DL (ref 8.5–10.1)
CHLORIDE SERPL-SCNC: 106 MMOL/L (ref 100–111)
CO2 SERPL-SCNC: 29 MMOL/L (ref 21–32)
CREAT SERPL-MCNC: 1.02 MG/DL (ref 0.6–1.3)
DIFFERENTIAL METHOD BLD: NORMAL
EKG ATRIAL RATE: 119 BPM
EKG DIAGNOSIS: NORMAL
EKG P AXIS: 67 DEGREES
EKG P-R INTERVAL: 152 MS
EKG Q-T INTERVAL: 326 MS
EKG QRS DURATION: 86 MS
EKG QTC CALCULATION (BAZETT): 458 MS
EKG R AXIS: 92 DEGREES
EKG T AXIS: 62 DEGREES
EKG VENTRICULAR RATE: 119 BPM
EOSINOPHIL # BLD: 0.1 K/UL (ref 0–0.4)
EOSINOPHIL NFR BLD: 1 % (ref 0–5)
ERYTHROCYTE [DISTWIDTH] IN BLOOD BY AUTOMATED COUNT: 12.8 % (ref 11.6–14.5)
GLOBULIN SER CALC-MCNC: 4.4 G/DL (ref 2–4)
GLUCOSE SERPL-MCNC: 139 MG/DL (ref 74–99)
HCT VFR BLD AUTO: 43.5 % (ref 36–48)
HGB BLD-MCNC: 14.6 G/DL (ref 13–16)
IMM GRANULOCYTES # BLD AUTO: 0 K/UL (ref 0–0.04)
IMM GRANULOCYTES NFR BLD AUTO: 0 % (ref 0–0.5)
LYMPHOCYTES # BLD: 2.5 K/UL (ref 0.9–3.6)
LYMPHOCYTES NFR BLD: 21 % (ref 21–52)
MCH RBC QN AUTO: 26.7 PG (ref 24–34)
MCHC RBC AUTO-ENTMCNC: 33.6 G/DL (ref 31–37)
MCV RBC AUTO: 79.7 FL (ref 78–100)
MONOCYTES # BLD: 0.9 K/UL (ref 0.05–1.2)
MONOCYTES NFR BLD: 8 % (ref 3–10)
NEUTS SEG # BLD: 8 K/UL (ref 1.8–8)
NEUTS SEG NFR BLD: 69 % (ref 40–73)
NRBC # BLD: 0 K/UL (ref 0–0.01)
NRBC BLD-RTO: 0 PER 100 WBC
PLATELET # BLD AUTO: 273 K/UL (ref 135–420)
PMV BLD AUTO: 9.6 FL (ref 9.2–11.8)
POTASSIUM SERPL-SCNC: 3.7 MMOL/L (ref 3.5–5.5)
PROT SERPL-MCNC: 8.1 G/DL (ref 6.4–8.2)
RBC # BLD AUTO: 5.46 M/UL (ref 4.35–5.65)
SODIUM SERPL-SCNC: 141 MMOL/L (ref 136–145)
TROPONIN I SERPL HS-MCNC: 4 NG/L (ref 0–78)
TROPONIN I SERPL HS-MCNC: 6 NG/L (ref 0–78)
WBC # BLD AUTO: 11.7 K/UL (ref 4.6–13.2)

## 2023-06-09 PROCEDURE — 71045 X-RAY EXAM CHEST 1 VIEW: CPT

## 2023-06-09 PROCEDURE — 6370000000 HC RX 637 (ALT 250 FOR IP): Performed by: EMERGENCY MEDICINE

## 2023-06-09 PROCEDURE — 93005 ELECTROCARDIOGRAM TRACING: CPT | Performed by: EMERGENCY MEDICINE

## 2023-06-09 PROCEDURE — 93010 ELECTROCARDIOGRAM REPORT: CPT | Performed by: INTERNAL MEDICINE

## 2023-06-09 PROCEDURE — 2500000003 HC RX 250 WO HCPCS: Performed by: EMERGENCY MEDICINE

## 2023-06-09 PROCEDURE — 80053 COMPREHEN METABOLIC PANEL: CPT

## 2023-06-09 PROCEDURE — 84484 ASSAY OF TROPONIN QUANT: CPT

## 2023-06-09 PROCEDURE — 85025 COMPLETE CBC W/AUTO DIFF WBC: CPT

## 2023-06-09 RX ORDER — METOPROLOL TARTRATE 5 MG/5ML
5 INJECTION INTRAVENOUS
Status: COMPLETED | OUTPATIENT
Start: 2023-06-09 | End: 2023-06-09

## 2023-06-09 RX ADMIN — METOPROLOL TARTRATE 25 MG: 25 TABLET, FILM COATED ORAL at 01:26

## 2023-06-09 RX ADMIN — METOPROLOL TARTRATE 5 MG: 5 INJECTION, SOLUTION INTRAVENOUS at 02:18

## 2023-06-09 ASSESSMENT — ENCOUNTER SYMPTOMS
RESPIRATORY NEGATIVE: 1
GASTROINTESTINAL NEGATIVE: 1

## 2023-06-09 ASSESSMENT — PAIN - FUNCTIONAL ASSESSMENT: PAIN_FUNCTIONAL_ASSESSMENT: 0-10

## 2023-06-09 ASSESSMENT — PAIN DESCRIPTION - PAIN TYPE: TYPE: ACUTE PAIN

## 2023-06-09 ASSESSMENT — PAIN SCALES - GENERAL: PAINLEVEL_OUTOF10: 3

## 2023-06-09 ASSESSMENT — PAIN DESCRIPTION - FREQUENCY: FREQUENCY: INTERMITTENT

## 2023-06-09 ASSESSMENT — PAIN DESCRIPTION - LOCATION: LOCATION: CHEST

## 2023-06-09 ASSESSMENT — PAIN DESCRIPTION - DESCRIPTORS: DESCRIPTORS: SORE

## 2023-06-09 ASSESSMENT — PAIN DESCRIPTION - ORIENTATION: ORIENTATION: RIGHT;LOWER

## 2023-06-09 ASSESSMENT — HEART SCORE: ECG: 0

## 2023-06-09 NOTE — ED PROVIDER NOTES
Chest wall: No tenderness. Abdominal:      General: There is no distension. Palpations: There is no mass. Tenderness: There is no abdominal tenderness. There is no right CVA tenderness, left CVA tenderness, guarding or rebound. Hernia: No hernia is present. Musculoskeletal:         General: No swelling, tenderness, deformity or signs of injury. Normal range of motion. Cervical back: Normal range of motion. No rigidity or tenderness. Right lower leg: No edema. Left lower leg: No edema. Lymphadenopathy:      Cervical: No cervical adenopathy. Skin:     General: Skin is warm. Capillary Refill: Capillary refill takes less than 2 seconds. Coloration: Skin is not jaundiced or pale. Findings: No bruising, erythema, lesion or rash. Neurological:      General: No focal deficit present. Mental Status: He is alert and oriented to person, place, and time. Cranial Nerves: No cranial nerve deficit. Sensory: No sensory deficit. Motor: No weakness.       Coordination: Coordination normal.      Gait: Gait normal.      Deep Tendon Reflexes: Reflexes normal.   Psychiatric:         Mood and Affect: Mood normal.       DIAGNOSTIC RESULTS     EKG: All EKG's are interpreted by the Emergency Department Physician who either signs or Co-signs this chart in the absence of a cardiologist.        RADIOLOGY:   Non-plain film images such as CT, Ultrasound and MRI are read by the radiologist. Plain radiographic images are visualized and preliminarily interpreted by the emergency physician with the below findings:        Interpretation per the Radiologist below, if available at the time of this note:    XR CHEST PORTABLE   Final Result   No acute abnormalities            ED BEDSIDE ULTRASOUND:   Performed by ED Physician - none    LABS:  Labs Reviewed   COMPREHENSIVE METABOLIC PANEL - Abnormal; Notable for the following components:       Result Value    Glucose 139 (*)

## 2023-06-09 NOTE — ED NOTES
Discharge instructions reviewed at this time. Patient verbalized understanding. Patient A&Ox4 and ambulatory at this time. Patient instructed on follow ups, medications and education and verbalized understanding. Updated vitals obtained and IV removed. Patient discharge complete.        Ramón Ramon RN  06/09/23 1343

## 2023-06-12 DIAGNOSIS — R00.0 SINUS TACHYCARDIA: ICD-10-CM

## 2023-06-12 RX ORDER — METOPROLOL TARTRATE 75 MG/1
75 TABLET, FILM COATED ORAL 2 TIMES DAILY
Qty: 180 TABLET | Refills: 3 | Status: SHIPPED | OUTPATIENT
Start: 2023-06-12

## 2023-08-16 DIAGNOSIS — E11.69 TYPE 2 DIABETES MELLITUS WITH OTHER SPECIFIED COMPLICATION, WITHOUT LONG-TERM CURRENT USE OF INSULIN (HCC): ICD-10-CM

## 2023-08-16 RX ORDER — ATORVASTATIN CALCIUM 20 MG/1
20 TABLET, FILM COATED ORAL NIGHTLY
Qty: 90 TABLET | Refills: 3 | Status: SHIPPED | OUTPATIENT
Start: 2023-08-16

## 2023-09-06 DIAGNOSIS — I10 HYPERTENSION, UNSPECIFIED TYPE: ICD-10-CM

## 2023-09-06 RX ORDER — AMLODIPINE BESYLATE 10 MG/1
10 TABLET ORAL DAILY
Qty: 180 TABLET | Refills: 3 | Status: SHIPPED | OUTPATIENT
Start: 2023-09-06

## 2023-09-13 DIAGNOSIS — E11.69 TYPE 2 DIABETES MELLITUS WITH OTHER SPECIFIED COMPLICATION, WITHOUT LONG-TERM CURRENT USE OF INSULIN (HCC): ICD-10-CM

## 2023-09-14 RX ORDER — ASPIRIN 81 MG/1
TABLET, CHEWABLE ORAL
Qty: 90 TABLET | Refills: 3 | Status: SHIPPED | OUTPATIENT
Start: 2023-09-14

## 2023-12-07 ENCOUNTER — OFFICE VISIT (OUTPATIENT)
Age: 38
End: 2023-12-07
Payer: COMMERCIAL

## 2023-12-07 VITALS
BODY MASS INDEX: 34.31 KG/M2 | OXYGEN SATURATION: 96 % | WEIGHT: 253 LBS | HEART RATE: 78 BPM | DIASTOLIC BLOOD PRESSURE: 70 MMHG | SYSTOLIC BLOOD PRESSURE: 120 MMHG

## 2023-12-07 DIAGNOSIS — E11.9 TYPE 2 DIABETES MELLITUS WITHOUT COMPLICATION, WITH LONG-TERM CURRENT USE OF INSULIN (HCC): Primary | ICD-10-CM

## 2023-12-07 DIAGNOSIS — I10 PRIMARY HYPERTENSION: ICD-10-CM

## 2023-12-07 DIAGNOSIS — R00.0 TACHYCARDIA: ICD-10-CM

## 2023-12-07 DIAGNOSIS — Z79.4 TYPE 2 DIABETES MELLITUS WITHOUT COMPLICATION, WITH LONG-TERM CURRENT USE OF INSULIN (HCC): Primary | ICD-10-CM

## 2023-12-07 PROCEDURE — 3074F SYST BP LT 130 MM HG: CPT | Performed by: INTERNAL MEDICINE

## 2023-12-07 PROCEDURE — 99214 OFFICE O/P EST MOD 30 MIN: CPT | Performed by: INTERNAL MEDICINE

## 2023-12-07 PROCEDURE — 3046F HEMOGLOBIN A1C LEVEL >9.0%: CPT | Performed by: INTERNAL MEDICINE

## 2023-12-07 PROCEDURE — 3078F DIAST BP <80 MM HG: CPT | Performed by: INTERNAL MEDICINE

## 2023-12-07 NOTE — PROGRESS NOTES
History of Present Illness:  41 YOM here for follow up. Overall he is doing well. He was seen in the hospital in April for sinus tachycardia that is longstanding, switched form Coreg to Lopressor, titrated, EF was normal.  He has longstanding diabetes. Hemoglobin A1c is down to 7. He is not doing any regular activity due to his blindness and we talked about using an exercise bike that is stationary. He is accompanied by family today. No new chest pain, dyspnea, PND, orthopnea or edema. Impression:  Recurrent atypical chest pain with multiple risk factors. Pharmacologic cardiac nuclear stress test June 2023 low risk. Echocardiogram April 2023 with normal EF. History of sinus tachycardia, likely physiologic, on Lopressor. Hypertension, stable today. Diabetes. Hemoglobin A1c down to 7. Dyslipidemia, on statin. History of retinitis pigmentosa and chronic blindness. History of hypogonadism, on testosterone. Remote Burkitt lymphoma. Plan:  I discussed his recent stress test and echocardiogram this year, low risk. We talked about exercise with a stationary bike. He continues to control his blood sugars well with his endocrinologist, Dr. Isha Mora. We will continue with aspirin and statin and beta blocker long term. I will plan to see back annually unless there is a change in symptoms.         Wt Readings from Last 3 Encounters:   12/07/23 114.8 kg (253 lb)   06/30/23 106.1 kg (234 lb)   06/09/23 106.1 kg (234 lb)     Past Medical History:   Diagnosis Date    Blind        Current Outpatient Medications   Medication Sig Dispense Refill    aspirin 81 MG chewable tablet CHEW AND SWALLOW 1 TABLET BY MOUTH DAILY 90 tablet 3    amLODIPine (NORVASC) 10 MG tablet TAKE 1 TABLET BY MOUTH DAILY 180 tablet 3    atorvastatin (LIPITOR) 20 MG tablet Take 1 tablet by mouth nightly 90 tablet 3    metoprolol tartrate 75 MG TABS Take 75 mg by mouth 2 times daily 180 tablet 3    metFORMIN (GLUCOPHAGE-XR) 500 MG

## 2024-02-14 PROCEDURE — 99285 EMERGENCY DEPT VISIT HI MDM: CPT

## 2024-02-14 PROCEDURE — 93005 ELECTROCARDIOGRAM TRACING: CPT | Performed by: EMERGENCY MEDICINE

## 2024-02-15 ENCOUNTER — HOSPITAL ENCOUNTER (EMERGENCY)
Facility: HOSPITAL | Age: 39
Discharge: HOME | End: 2024-02-15
Attending: EMERGENCY MEDICINE
Payer: MEDICAID

## 2024-02-15 ENCOUNTER — APPOINTMENT (OUTPATIENT)
Facility: HOSPITAL | Age: 39
End: 2024-02-15
Payer: MEDICAID

## 2024-02-15 VITALS
SYSTOLIC BLOOD PRESSURE: 134 MMHG | DIASTOLIC BLOOD PRESSURE: 87 MMHG | TEMPERATURE: 98.8 F | BODY MASS INDEX: 32.91 KG/M2 | WEIGHT: 243 LBS | RESPIRATION RATE: 18 BRPM | OXYGEN SATURATION: 97 % | HEART RATE: 107 BPM | HEIGHT: 72 IN

## 2024-02-15 DIAGNOSIS — R07.89 CHEST WALL PAIN: ICD-10-CM

## 2024-02-15 DIAGNOSIS — F41.9 ACUTE ANXIETY: Primary | ICD-10-CM

## 2024-02-15 LAB
ALBUMIN SERPL-MCNC: 3.7 G/DL (ref 3.4–5)
ALBUMIN/GLOB SERPL: 0.9 (ref 0.8–1.7)
ALP SERPL-CCNC: 123 U/L (ref 45–117)
ALT SERPL-CCNC: 35 U/L (ref 16–61)
ANION GAP SERPL CALC-SCNC: 0 MMOL/L (ref 3–18)
AST SERPL-CCNC: 17 U/L (ref 10–38)
BASOPHILS # BLD: 0.1 K/UL (ref 0–0.1)
BASOPHILS NFR BLD: 1 % (ref 0–2)
BILIRUB SERPL-MCNC: 0.4 MG/DL (ref 0.2–1)
BUN SERPL-MCNC: 14 MG/DL (ref 7–18)
BUN/CREAT SERPL: 15 (ref 12–20)
CALCIUM SERPL-MCNC: 9.4 MG/DL (ref 8.5–10.1)
CHLORIDE SERPL-SCNC: 108 MMOL/L (ref 100–111)
CO2 SERPL-SCNC: 31 MMOL/L (ref 21–32)
CREAT SERPL-MCNC: 0.93 MG/DL (ref 0.6–1.3)
DIFFERENTIAL METHOD BLD: ABNORMAL
EKG ATRIAL RATE: 109 BPM
EKG DIAGNOSIS: NORMAL
EKG P AXIS: 65 DEGREES
EKG P-R INTERVAL: 160 MS
EKG Q-T INTERVAL: 330 MS
EKG QRS DURATION: 84 MS
EKG QTC CALCULATION (BAZETT): 447 MS
EKG R AXIS: 81 DEGREES
EKG T AXIS: 36 DEGREES
EKG VENTRICULAR RATE: 110 BPM
EOSINOPHIL # BLD: 0.1 K/UL (ref 0–0.4)
EOSINOPHIL NFR BLD: 1 % (ref 0–5)
ERYTHROCYTE [DISTWIDTH] IN BLOOD BY AUTOMATED COUNT: 13.1 % (ref 11.6–14.5)
GLOBULIN SER CALC-MCNC: 3.9 G/DL (ref 2–4)
GLUCOSE SERPL-MCNC: 178 MG/DL (ref 74–99)
HCT VFR BLD AUTO: 44.2 % (ref 36–48)
HGB BLD-MCNC: 14.9 G/DL (ref 13–16)
IMM GRANULOCYTES # BLD AUTO: 0.1 K/UL (ref 0–0.04)
IMM GRANULOCYTES NFR BLD AUTO: 1 % (ref 0–0.5)
LIPASE SERPL-CCNC: 29 U/L (ref 13–75)
LYMPHOCYTES # BLD: 2.2 K/UL (ref 0.9–3.6)
LYMPHOCYTES NFR BLD: 19 % (ref 21–52)
MCH RBC QN AUTO: 27.3 PG (ref 24–34)
MCHC RBC AUTO-ENTMCNC: 33.7 G/DL (ref 31–37)
MCV RBC AUTO: 81.1 FL (ref 78–100)
MONOCYTES # BLD: 0.9 K/UL (ref 0.05–1.2)
MONOCYTES NFR BLD: 8 % (ref 3–10)
NEUTS SEG # BLD: 8.1 K/UL (ref 1.8–8)
NEUTS SEG NFR BLD: 71 % (ref 40–73)
NRBC # BLD: 0 K/UL (ref 0–0.01)
NRBC BLD-RTO: 0 PER 100 WBC
PLATELET # BLD AUTO: 210 K/UL (ref 135–420)
PMV BLD AUTO: 9.6 FL (ref 9.2–11.8)
POTASSIUM SERPL-SCNC: 3.9 MMOL/L (ref 3.5–5.5)
PROT SERPL-MCNC: 7.6 G/DL (ref 6.4–8.2)
RBC # BLD AUTO: 5.45 M/UL (ref 4.35–5.65)
SODIUM SERPL-SCNC: 139 MMOL/L (ref 136–145)
TROPONIN I SERPL HS-MCNC: 6 NG/L (ref 0–78)
WBC # BLD AUTO: 11.4 K/UL (ref 4.6–13.2)

## 2024-02-15 PROCEDURE — 85025 COMPLETE CBC W/AUTO DIFF WBC: CPT

## 2024-02-15 PROCEDURE — 80053 COMPREHEN METABOLIC PANEL: CPT

## 2024-02-15 PROCEDURE — 83690 ASSAY OF LIPASE: CPT

## 2024-02-15 PROCEDURE — 71045 X-RAY EXAM CHEST 1 VIEW: CPT

## 2024-02-15 PROCEDURE — 84484 ASSAY OF TROPONIN QUANT: CPT

## 2024-02-15 PROCEDURE — 93010 ELECTROCARDIOGRAM REPORT: CPT | Performed by: INTERNAL MEDICINE

## 2024-02-15 NOTE — ED PROVIDER NOTES
TIMES A WEEK    METFORMIN (GLUCOPHAGE-XR) 500 MG EXTENDED RELEASE TABLET    Take 1 tablet by mouth 2 times daily    METOPROLOL TARTRATE 75 MG TABS    Take 75 mg by mouth 2 times daily    OMEPRAZOLE (PRILOSEC OTC) 20 MG TABLET    Take 1 tablet by mouth daily as needed    PIOGLITAZONE (ACTOS) 30 MG TABLET    Take 1 tablet by mouth every morning    TESTOSTERONE 1.62 % GEL    APPLY 1 PUMP TO EACH UPPER ARM AND SHOULDER EVERY MORNING       ALLERGIES     Ceclor [cefaclor]    FAMILY HISTORY     No family history on file.       SOCIAL HISTORY       Social History     Socioeconomic History    Marital status: Single   Tobacco Use    Smoking status: Never    Smokeless tobacco: Never   Substance and Sexual Activity    Alcohol use: Not Currently    Drug use: Never         PHYSICAL EXAM       ED Triage Vitals [02/14/24 2008]   BP Temp Temp Source Pulse Respirations SpO2 Height Weight - Scale   126/85 98.8 °F (37.1 °C) Tympanic (!) 116 18 97 % 1.829 m (6') 110.2 kg (243 lb)       Physical Exam  Constitutional:       Appearance: He is well-developed.   HENT:      Head: Normocephalic and atraumatic.   Cardiovascular:      Rate and Rhythm: Normal rate and regular rhythm.   Abdominal:      General: Abdomen is flat.      Tenderness: There is no abdominal tenderness.   Skin:     General: Skin is warm.   Neurological:      General: No focal deficit present.      Mental Status: He is alert.   Psychiatric:         Mood and Affect: Mood normal.         Recent Results (from the past 24 hour(s))   EKG 12 Lead    Collection Time: 02/14/24  8:23 PM   Result Value Ref Range    Ventricular Rate 110 BPM    Atrial Rate 109 BPM    P-R Interval 160 ms    QRS Duration 84 ms    Q-T Interval 330 ms    QTc Calculation (Bazett) 447 ms    P Axis 65 degrees    R Axis 81 degrees    T Axis 36 degrees    Diagnosis       Sinus tachycardia  Consider left ventricular hypertrophy  ST elevation suggests acute pericarditis     CBC with Auto Differential

## 2024-02-15 NOTE — ED TRIAGE NOTES
Right lower abdominal with loose bowels since noon, then started having a sharp pain to left upper back that started around 1830 tonight. Denies NV, SOB or injury.

## 2024-02-15 NOTE — ED NOTES
Patient discharged at this time. This RN reviewed discharge instructions at this time with the patient and parent.  patient and parent verbalized understanding and does not have any questions. Pt ambulatory upon discharge, & in stable condition. Pt armband removed & shredded. Patient I.V was discharged.

## 2024-04-19 ENCOUNTER — APPOINTMENT (OUTPATIENT)
Facility: HOSPITAL | Age: 39
End: 2024-04-19
Payer: MEDICAID

## 2024-04-19 ENCOUNTER — HOSPITAL ENCOUNTER (EMERGENCY)
Facility: HOSPITAL | Age: 39
Discharge: HOME | End: 2024-04-19
Attending: EMERGENCY MEDICINE
Payer: MEDICAID

## 2024-04-19 VITALS
DIASTOLIC BLOOD PRESSURE: 74 MMHG | TEMPERATURE: 98.2 F | SYSTOLIC BLOOD PRESSURE: 111 MMHG | RESPIRATION RATE: 25 BRPM | HEART RATE: 86 BPM | OXYGEN SATURATION: 97 %

## 2024-04-19 DIAGNOSIS — R07.89 ATYPICAL CHEST PAIN: Primary | ICD-10-CM

## 2024-04-19 LAB
ALBUMIN SERPL-MCNC: 3.9 G/DL (ref 3.4–5)
ALBUMIN/GLOB SERPL: 1.2 (ref 0.8–1.7)
ALP SERPL-CCNC: 124 U/L (ref 45–117)
ALT SERPL-CCNC: 34 U/L (ref 16–61)
ANION GAP SERPL CALC-SCNC: 4 MMOL/L (ref 3–18)
APTT PPP: 26.7 SEC (ref 23–36.4)
AST SERPL-CCNC: 29 U/L (ref 10–38)
BASOPHILS # BLD: 0.1 K/UL (ref 0–0.1)
BASOPHILS NFR BLD: 1 % (ref 0–2)
BILIRUB SERPL-MCNC: 0.6 MG/DL (ref 0.2–1)
BUN SERPL-MCNC: 19 MG/DL (ref 7–18)
BUN/CREAT SERPL: 19 (ref 12–20)
CALCIUM SERPL-MCNC: 9.2 MG/DL (ref 8.5–10.1)
CHLORIDE SERPL-SCNC: 107 MMOL/L (ref 100–111)
CO2 SERPL-SCNC: 31 MMOL/L (ref 21–32)
CREAT SERPL-MCNC: 1 MG/DL (ref 0.6–1.3)
DIFFERENTIAL METHOD BLD: ABNORMAL
EKG ATRIAL RATE: 86 BPM
EKG DIAGNOSIS: NORMAL
EKG P AXIS: 46 DEGREES
EKG P-R INTERVAL: 170 MS
EKG Q-T INTERVAL: 356 MS
EKG QRS DURATION: 88 MS
EKG QTC CALCULATION (BAZETT): 426 MS
EKG R AXIS: 87 DEGREES
EKG T AXIS: 46 DEGREES
EKG VENTRICULAR RATE: 86 BPM
EOSINOPHIL # BLD: 0.2 K/UL (ref 0–0.4)
EOSINOPHIL NFR BLD: 2 % (ref 0–5)
ERYTHROCYTE [DISTWIDTH] IN BLOOD BY AUTOMATED COUNT: 12.9 % (ref 11.6–14.5)
GLOBULIN SER CALC-MCNC: 3.3 G/DL (ref 2–4)
GLUCOSE SERPL-MCNC: 99 MG/DL (ref 74–99)
HCT VFR BLD AUTO: 46.5 % (ref 36–48)
HGB BLD-MCNC: 15.3 G/DL (ref 13–16)
IMM GRANULOCYTES # BLD AUTO: 0.1 K/UL (ref 0–0.04)
IMM GRANULOCYTES NFR BLD AUTO: 1 % (ref 0–0.5)
INR PPP: 0.9 (ref 0.9–1.1)
LIPASE SERPL-CCNC: 44 U/L (ref 13–75)
LYMPHOCYTES # BLD: 3 K/UL (ref 0.9–3.6)
LYMPHOCYTES NFR BLD: 30 % (ref 21–52)
MCH RBC QN AUTO: 26.7 PG (ref 24–34)
MCHC RBC AUTO-ENTMCNC: 32.9 G/DL (ref 31–37)
MCV RBC AUTO: 81.3 FL (ref 78–100)
MONOCYTES # BLD: 1 K/UL (ref 0.05–1.2)
MONOCYTES NFR BLD: 10 % (ref 3–10)
NEUTS SEG # BLD: 5.8 K/UL (ref 1.8–8)
NEUTS SEG NFR BLD: 57 % (ref 40–73)
NRBC # BLD: 0 K/UL (ref 0–0.01)
NRBC BLD-RTO: 0 PER 100 WBC
PLATELET # BLD AUTO: 185 K/UL (ref 135–420)
PMV BLD AUTO: 9.5 FL (ref 9.2–11.8)
POTASSIUM SERPL-SCNC: 4.4 MMOL/L (ref 3.5–5.5)
PROT SERPL-MCNC: 7.2 G/DL (ref 6.4–8.2)
PROTHROMBIN TIME: 12.1 SEC (ref 11.9–14.7)
RBC # BLD AUTO: 5.72 M/UL (ref 4.35–5.65)
SODIUM SERPL-SCNC: 142 MMOL/L (ref 136–145)
TROPONIN I SERPL HS-MCNC: 5 NG/L (ref 0–78)
TROPONIN I SERPL HS-MCNC: 6 NG/L (ref 0–78)
TROPONIN I SERPL HS-MCNC: 6 NG/L (ref 0–78)
WBC # BLD AUTO: 10.2 K/UL (ref 4.6–13.2)

## 2024-04-19 PROCEDURE — 83690 ASSAY OF LIPASE: CPT

## 2024-04-19 PROCEDURE — 80053 COMPREHEN METABOLIC PANEL: CPT

## 2024-04-19 PROCEDURE — 93010 ELECTROCARDIOGRAM REPORT: CPT | Performed by: INTERNAL MEDICINE

## 2024-04-19 PROCEDURE — 85610 PROTHROMBIN TIME: CPT

## 2024-04-19 PROCEDURE — 85730 THROMBOPLASTIN TIME PARTIAL: CPT

## 2024-04-19 PROCEDURE — 71045 X-RAY EXAM CHEST 1 VIEW: CPT

## 2024-04-19 PROCEDURE — 85025 COMPLETE CBC W/AUTO DIFF WBC: CPT

## 2024-04-19 PROCEDURE — 93005 ELECTROCARDIOGRAM TRACING: CPT | Performed by: EMERGENCY MEDICINE

## 2024-04-19 PROCEDURE — 6370000000 HC RX 637 (ALT 250 FOR IP): Performed by: EMERGENCY MEDICINE

## 2024-04-19 PROCEDURE — 71046 X-RAY EXAM CHEST 2 VIEWS: CPT

## 2024-04-19 PROCEDURE — 84484 ASSAY OF TROPONIN QUANT: CPT

## 2024-04-19 PROCEDURE — 99285 EMERGENCY DEPT VISIT HI MDM: CPT

## 2024-04-19 RX ORDER — NITROGLYCERIN 0.4 MG/1
0.4 TABLET SUBLINGUAL EVERY 5 MIN PRN
Status: COMPLETED | OUTPATIENT
Start: 2024-04-19 | End: 2024-04-19

## 2024-04-19 RX ORDER — MORPHINE SULFATE 4 MG/ML
4 INJECTION, SOLUTION INTRAMUSCULAR; INTRAVENOUS EVERY 4 HOURS PRN
Status: DISCONTINUED | OUTPATIENT
Start: 2024-04-19 | End: 2024-04-19 | Stop reason: HOSPADM

## 2024-04-19 RX ORDER — ASPIRIN 81 MG/1
324 TABLET, CHEWABLE ORAL ONCE
Status: COMPLETED | OUTPATIENT
Start: 2024-04-19 | End: 2024-04-19

## 2024-04-19 RX ORDER — IBUPROFEN 600 MG/1
600 TABLET ORAL 3 TIMES DAILY PRN
Qty: 30 TABLET | Refills: 0 | Status: SHIPPED | OUTPATIENT
Start: 2024-04-19

## 2024-04-19 RX ADMIN — ASPIRIN 81 MG CHEWABLE TABLET 324 MG: 81 TABLET CHEWABLE at 03:34

## 2024-04-19 RX ADMIN — NITROGLYCERIN 0.4 MG: 0.4 TABLET, ORALLY DISINTEGRATING SUBLINGUAL at 03:27

## 2024-04-19 RX ADMIN — NITROGLYCERIN 0.4 MG: 0.4 TABLET, ORALLY DISINTEGRATING SUBLINGUAL at 03:37

## 2024-04-19 RX ADMIN — NITROGLYCERIN 0.4 MG: 0.4 TABLET, ORALLY DISINTEGRATING SUBLINGUAL at 03:32

## 2024-04-19 ASSESSMENT — PAIN DESCRIPTION - LOCATION: LOCATION: CHEST

## 2024-04-19 ASSESSMENT — PAIN DESCRIPTION - ORIENTATION: ORIENTATION: LEFT

## 2024-04-19 ASSESSMENT — PAIN DESCRIPTION - FREQUENCY: FREQUENCY: CONTINUOUS

## 2024-04-19 ASSESSMENT — ENCOUNTER SYMPTOMS
RESPIRATORY NEGATIVE: 1
GASTROINTESTINAL NEGATIVE: 1

## 2024-04-19 ASSESSMENT — HEART SCORE: ECG: NORMAL

## 2024-04-19 ASSESSMENT — PAIN DESCRIPTION - PAIN TYPE: TYPE: ACUTE PAIN

## 2024-04-19 ASSESSMENT — PAIN DESCRIPTION - DESCRIPTORS: DESCRIPTORS: DISCOMFORT

## 2024-04-19 ASSESSMENT — PAIN SCALES - GENERAL: PAINLEVEL_OUTOF10: 8

## 2024-04-19 ASSESSMENT — PAIN DESCRIPTION - ONSET: ONSET: AWAKENED FROM SLEEP

## 2024-04-19 NOTE — ED PROVIDER NOTES
`[facility] EMERGENCY DEPT  eMERGENCY dEPARTMENT eNCOUnter      Pt Name: Gala Hare  MRN: [id number]  Birthdate 1985 of evaluation: 4/19/2024  Provider:Kavon Cardozo MD    CHIEF COMPLAINT       HPI    Gala Hare is a 39 y.o. male  c/o having a sudden onset of left anterior chest pain that started 1 hours prior to ER visit.  Patient states his chest pain is a 8/10.  He initially states the pain was deep but then later states that the pain feels like a kink in his neck on the left side.  Patient was seen in the ER a month ago for similar symptoms.    ROS    Review of Systems   Constitutional: Negative.    HENT: Negative.     Respiratory: Negative.     Cardiovascular: Negative.    Gastrointestinal: Negative.    Genitourinary: Negative.    Musculoskeletal: Negative.    Skin: Negative.    Neurological: Negative.    Psychiatric/Behavioral:  Positive for agitation.    All other systems reviewed and are negative.      Except as noted above the remainder of the review of systems was reviewed and negative.       PAST MEDICAL HISTORY     Past Medical History:   Diagnosis Date    Blind     Diabetes mellitus (HCC)     Hypertension          SURGICAL HISTORY     History reviewed. No pertinent surgical history.      CURRENTMEDICATIONS       Previous Medications    AMLODIPINE (NORVASC) 10 MG TABLET    TAKE 1 TABLET BY MOUTH DAILY    ASPIRIN 81 MG CHEWABLE TABLET    CHEW AND SWALLOW 1 TABLET BY MOUTH DAILY    ATORVASTATIN (LIPITOR) 20 MG TABLET    Take 1 tablet by mouth nightly    BLOOD GLUCOSE MONITOR STRIPS    Test two times a day & as needed for symptoms of irregular blood glucose. Dispense sufficient amount for indicated testing frequency plus additional to accommodate PRN testing needs.    GLUCOSE MONITORING (FREESTYLE FREEDOM) KIT    1 kit by Does not apply route daily    INSULIN GLARGINE (LANTUS SOLOSTAR) 100 UNIT/ML INJECTION PEN    Inject 25 Units into the skin nightly    INSULIN PEN NEEDLE (PEN NEEDLES 3/16\") 31G  showed a normal heart.  He also had mild cardiac enzymes that were normal.  Cardiology evaluated patient at the time he was admitted and discharged him home as atypical chest pain       Amount and/or Complexity of Data Reviewed  Clinical lab tests: reviewed  Decide to obtain previous medical records or to obtain history from someone other than the patient: yes  Review and summarize past medical records: yes  Discuss the patient with other providers: yes (Hospitalist Dr. Tu Carrera.  He reviewed the patient medical records for admission.   He states with the past medical history, recent cardiac admission and work that was normal, his current work up dosen't warrant an admisson. He states patient to be discharge home with F/U with his cardiologist.  Patient has had 3 normal troponin during this admission.)        XR CHEST PORTABLE   Final Result   1. No acute pulmonary process.      Thank you for this referral.        CLINICAL MANAGEMENT TOOLS:  Not Applicable, Chest Pain: MEDICAL DECISION MAKING:   I considered, but did not perform, additional testing such CT Angiogram, as well as admission or transfer to a higher level of care.     I utilized an evidence-based risk rating tool (CMT) along with my training and experience to weigh the risk of discharge against the risks of further testing, imaging, or hospitalization. At this time, I estimate the risks of additional testing, imaging, or hospitalization to be equal to or greater than the risk of discharge(in the case of discharge home).      The patient's HEART Score is 0. In rare cases, I give patients with HEART Score of 4 the option of discharge, but only when they meet criteria for \"Low 4,\" meaning that HST was used, and the 4 is not from a highly suspicious story, highly suspicious EKG, or positive cardiac enzymes.  In these selected cases, the risk of a \"Low 4\" is still most likely lower than the risk of admission and further testing/ imaging.

## 2024-04-19 NOTE — ED NOTES
Discharge teaching provided to patient regarding treatment received,medications prescribed, and proper follow-up care. Patient verbalized understanding directions and follow up care. Patient left ambulatory with family member with discharge paperwork in hand.

## 2024-04-19 NOTE — ED NOTES
Informed MD of current BP - 110 / 63, which was a drop from initial BP after 3 doses of nitro. Per Dr Cardozo, hold morphine and continue to monitor chest pain. Current rating 2/10 (dropped from 8/10).

## 2024-04-25 NOTE — ED NOTES
Informed by lab that their machine is down and that it will be an hour before they can run the repeat troponin. Patient updated at this time. Patient understanding and denies any needs at this time. Patient's family also denies any needs at this time.       Harpreet Mohan RN  06/09/23 [id number] no

## 2024-06-07 DIAGNOSIS — R00.0 SINUS TACHYCARDIA: ICD-10-CM

## 2024-06-10 RX ORDER — METOPROLOL TARTRATE 75 MG/1
75 TABLET, FILM COATED ORAL 2 TIMES DAILY
Qty: 180 TABLET | Refills: 3 | Status: SHIPPED | OUTPATIENT
Start: 2024-06-10

## 2024-07-25 DIAGNOSIS — E11.69 TYPE 2 DIABETES MELLITUS WITH OTHER SPECIFIED COMPLICATION, WITHOUT LONG-TERM CURRENT USE OF INSULIN (HCC): ICD-10-CM

## 2024-07-26 RX ORDER — ATORVASTATIN CALCIUM 20 MG/1
20 TABLET, FILM COATED ORAL NIGHTLY
Qty: 90 TABLET | Refills: 3 | Status: SHIPPED | OUTPATIENT
Start: 2024-07-26

## 2024-09-10 DIAGNOSIS — E11.69 TYPE 2 DIABETES MELLITUS WITH OTHER SPECIFIED COMPLICATION, WITHOUT LONG-TERM CURRENT USE OF INSULIN (HCC): ICD-10-CM

## 2024-09-11 RX ORDER — ASPIRIN 81 MG/1
TABLET, CHEWABLE ORAL
Qty: 90 TABLET | Refills: 3 | Status: SHIPPED | OUTPATIENT
Start: 2024-09-11

## 2024-11-26 DIAGNOSIS — I10 HYPERTENSION, UNSPECIFIED TYPE: ICD-10-CM

## 2024-11-26 RX ORDER — AMLODIPINE BESYLATE 10 MG/1
10 TABLET ORAL DAILY
Qty: 90 TABLET | Refills: 1 | Status: SHIPPED | OUTPATIENT
Start: 2024-11-26

## 2025-02-12 ENCOUNTER — OFFICE VISIT (OUTPATIENT)
Age: 40
End: 2025-02-12
Payer: MEDICAID

## 2025-02-12 VITALS
BODY MASS INDEX: 38.6 KG/M2 | WEIGHT: 285 LBS | OXYGEN SATURATION: 96 % | SYSTOLIC BLOOD PRESSURE: 126 MMHG | HEIGHT: 72 IN | HEART RATE: 91 BPM | DIASTOLIC BLOOD PRESSURE: 80 MMHG

## 2025-02-12 DIAGNOSIS — I10 HYPERTENSION, UNSPECIFIED TYPE: ICD-10-CM

## 2025-02-12 DIAGNOSIS — R00.1 BRADYCARDIA: ICD-10-CM

## 2025-02-12 DIAGNOSIS — R00.0 SINUS TACHYCARDIA: ICD-10-CM

## 2025-02-12 DIAGNOSIS — R07.9 CHEST PAIN, UNSPECIFIED TYPE: Primary | ICD-10-CM

## 2025-02-12 DIAGNOSIS — E08.8 DIABETES MELLITUS DUE TO UNDERLYING CONDITION WITH UNSPECIFIED COMPLICATIONS (HCC): ICD-10-CM

## 2025-02-12 PROCEDURE — 93000 ELECTROCARDIOGRAM COMPLETE: CPT | Performed by: INTERNAL MEDICINE

## 2025-02-12 PROCEDURE — 99214 OFFICE O/P EST MOD 30 MIN: CPT | Performed by: INTERNAL MEDICINE

## 2025-02-12 PROCEDURE — 3074F SYST BP LT 130 MM HG: CPT | Performed by: INTERNAL MEDICINE

## 2025-02-12 PROCEDURE — 3079F DIAST BP 80-89 MM HG: CPT | Performed by: INTERNAL MEDICINE

## 2025-02-12 NOTE — PROGRESS NOTES
History of Present Illness:  39 year-old male here for followup.  His vision is very poor and he is limited in his overall activity and exercise.  He has gained about 50 pounds since end of 2023.  Some occasional dyspnea, but no significant chest pain, syncope, PND, orthopnea or edema.  He is accompanied by family.        Impression:    History of recurrent atypical chest pain with nuclear stress test 06/2023 low risk.   Echocardiogram 04/2023 with normal EF.    Hypertension, stable.    Diabetes on insulin.  Hemoglobin A1C down to 5, followed by Dr. Cordova.    Sinus tachycardia.  On Lopressor and stable.    Dyslipidemia on statin.    History of retinitis pigmentosa and chronic blindness.   History of hypogonadism.  On testosterone replacement.    Remote Burkitt lymphoma.      Plan:  Last echocardiogram was over a year ago and he has gained about 50 pounds.  His BMI is now 38.  We talked about ways to minimize caloric intake, as well as exercise, even potentially exercise bike.  His insulin was decreased in the hopes of helping with his weight gain.  I am going to obtain a follow-up echocardiogram to make sure there is no change in heart function given his occasional dyspnea and diabetes and see him back annually unless there is a clinical change or his echocardiogram is abnormal.            Wt Readings from Last 3 Encounters:   02/12/25 129.3 kg (285 lb)   02/14/24 110.2 kg (243 lb)   12/07/23 114.8 kg (253 lb)     Past Medical History:   Diagnosis Date    Blind     Diabetes mellitus (HCC)     Hypertension        Current Outpatient Medications   Medication Sig Dispense Refill    amLODIPine (NORVASC) 10 MG tablet Take 1 tablet by mouth daily 90 tablet 1    aspirin 81 MG chewable tablet CHEW AND SWALLOW 1 TABLET BY MOUTH DAILY 90 tablet 3    atorvastatin (LIPITOR) 20 MG tablet TAKE 1 TABLET BY MOUTH EVERY NIGHT 90 tablet 3    Metoprolol Tartrate 75 MG TABS Take 75 mg by mouth 2 times daily 180 tablet 3    ibuprofen

## 2025-05-20 DIAGNOSIS — I10 HYPERTENSION, UNSPECIFIED TYPE: ICD-10-CM

## 2025-05-20 RX ORDER — AMLODIPINE BESYLATE 10 MG/1
10 TABLET ORAL DAILY
Qty: 90 TABLET | Refills: 1 | Status: SHIPPED | OUTPATIENT
Start: 2025-05-20

## 2025-05-28 DIAGNOSIS — R00.0 SINUS TACHYCARDIA: ICD-10-CM

## 2025-05-28 RX ORDER — METOPROLOL TARTRATE 75 MG/1
1 TABLET ORAL 2 TIMES DAILY
Qty: 180 TABLET | Refills: 3 | Status: SHIPPED | OUTPATIENT
Start: 2025-05-28

## 2025-06-27 DIAGNOSIS — E78.00 HYPERCHOLESTEROLEMIA: Primary | ICD-10-CM

## 2025-06-27 DIAGNOSIS — E11.69 TYPE 2 DIABETES MELLITUS WITH OTHER SPECIFIED COMPLICATION, WITHOUT LONG-TERM CURRENT USE OF INSULIN (HCC): ICD-10-CM

## 2025-06-30 RX ORDER — ATORVASTATIN CALCIUM 20 MG/1
20 TABLET, FILM COATED ORAL NIGHTLY
Qty: 90 TABLET | Refills: 3 | Status: SHIPPED | OUTPATIENT
Start: 2025-06-30

## 2025-08-17 DIAGNOSIS — E11.69 TYPE 2 DIABETES MELLITUS WITH OTHER SPECIFIED COMPLICATION, WITHOUT LONG-TERM CURRENT USE OF INSULIN (HCC): ICD-10-CM

## 2025-08-18 RX ORDER — ASPIRIN 81 MG/1
81 TABLET, CHEWABLE ORAL
Qty: 90 TABLET | Refills: 3 | Status: SHIPPED | OUTPATIENT
Start: 2025-08-18